# Patient Record
Sex: FEMALE | Race: WHITE | URBAN - METROPOLITAN AREA
[De-identification: names, ages, dates, MRNs, and addresses within clinical notes are randomized per-mention and may not be internally consistent; named-entity substitution may affect disease eponyms.]

---

## 2020-08-31 ENCOUNTER — IP HISTORICAL/CONVERTED ENCOUNTER (OUTPATIENT)
Dept: OTHER | Age: 85
End: 2020-08-31

## 2022-01-12 ENCOUNTER — PREPPED CHART (OUTPATIENT)
Dept: URBAN - METROPOLITAN AREA CLINIC 67 | Facility: CLINIC | Age: 87
End: 2022-01-12

## 2022-01-12 PROBLEM — H35.3212 NEOVASCULAR AMD WITH INACTIVE CNV: Noted: 2022-01-12

## 2022-01-12 PROBLEM — H35.3122 DRY AMD, INTERMEDIATE DRY STAGE: Noted: 2022-01-12

## 2022-01-12 PROBLEM — H35.713 CENTRAL SEROUS RETINOPATHY: Noted: 2022-01-12

## 2022-01-12 PROBLEM — H43.813 POSTERIOR VITREOUS DETACHMENT: Noted: 2022-01-12

## 2022-01-12 PROBLEM — H35.712 CENTRAL SEROUS RETINOPATHY: Noted: 2022-01-12

## 2022-03-24 ASSESSMENT — VISUAL ACUITY
OS_SC: 20/30-1
OD_SC: 20/40-1

## 2022-03-24 ASSESSMENT — TONOMETRY
OD_IOP_MMHG: 17
OS_IOP_MMHG: 18

## 2022-03-29 ENCOUNTER — FOLLOW UP (OUTPATIENT)
Dept: URBAN - METROPOLITAN AREA CLINIC 67 | Facility: CLINIC | Age: 87
End: 2022-03-29

## 2022-03-29 DIAGNOSIS — H35.3122: ICD-10-CM

## 2022-03-29 DIAGNOSIS — H35.3212: ICD-10-CM

## 2022-03-29 PROCEDURE — 92134 CPTRZ OPH DX IMG PST SGM RTA: CPT

## 2022-03-29 PROCEDURE — 92014 COMPRE OPH EXAM EST PT 1/>: CPT

## 2022-03-29 PROCEDURE — 92202 OPSCPY EXTND ON/MAC DRAW: CPT

## 2022-03-29 ASSESSMENT — TONOMETRY
OS_IOP_MMHG: 16
OD_IOP_MMHG: 15

## 2022-03-29 ASSESSMENT — VISUAL ACUITY
OS_SC: 20/70+2
OD_SC: 20/60+1
OS_PH: 20/50-2
OD_PH: 20/30-2

## 2022-06-22 ENCOUNTER — FOLLOW UP (OUTPATIENT)
Dept: URBAN - METROPOLITAN AREA CLINIC 67 | Facility: CLINIC | Age: 87
End: 2022-06-22

## 2022-06-22 DIAGNOSIS — H35.3212: ICD-10-CM

## 2022-06-22 DIAGNOSIS — H35.3122: ICD-10-CM

## 2022-06-22 PROCEDURE — 92202 OPSCPY EXTND ON/MAC DRAW: CPT

## 2022-06-22 PROCEDURE — 92014 COMPRE OPH EXAM EST PT 1/>: CPT

## 2022-06-22 PROCEDURE — 92134 CPTRZ OPH DX IMG PST SGM RTA: CPT

## 2022-06-22 ASSESSMENT — VISUAL ACUITY
OD_SC: 20/40+1
OS_PH: 20/40-1
OS_SC: 20/60
OD_PH: 20/25+1

## 2022-06-22 ASSESSMENT — TONOMETRY
OD_IOP_MMHG: 18
OS_IOP_MMHG: 15

## 2022-10-26 ENCOUNTER — FOLLOW UP (OUTPATIENT)
Dept: URBAN - METROPOLITAN AREA CLINIC 67 | Facility: CLINIC | Age: 87
End: 2022-10-26

## 2022-10-26 DIAGNOSIS — H35.3122: ICD-10-CM

## 2022-10-26 DIAGNOSIS — H35.3211: ICD-10-CM

## 2022-10-26 PROCEDURE — 92134 CPTRZ OPH DX IMG PST SGM RTA: CPT

## 2022-10-26 PROCEDURE — 67028 INJECTION EYE DRUG: CPT

## 2022-10-26 PROCEDURE — PFS EYLEA PFS

## 2022-10-26 PROCEDURE — 92235 FLUORESCEIN ANGRPH MLTIFRAME: CPT

## 2022-10-26 PROCEDURE — 99214 OFFICE O/P EST MOD 30 MIN: CPT | Mod: 25

## 2022-10-26 PROCEDURE — 92202 OPSCPY EXTND ON/MAC DRAW: CPT | Mod: 59

## 2022-10-26 ASSESSMENT — VISUAL ACUITY
OD_PH: 20/20-1
OD_SC: 20/40
OS_SC: 20/30
OS_PH: 20/25+1

## 2022-10-26 ASSESSMENT — TONOMETRY
OS_IOP_MMHG: 18
OD_IOP_MMHG: 14

## 2022-12-07 ENCOUNTER — FOLLOW UP (OUTPATIENT)
Dept: URBAN - METROPOLITAN AREA CLINIC 67 | Facility: CLINIC | Age: 87
End: 2022-12-07

## 2022-12-07 DIAGNOSIS — H35.3211: ICD-10-CM

## 2022-12-07 DIAGNOSIS — H35.713: ICD-10-CM

## 2022-12-07 DIAGNOSIS — H35.3122: ICD-10-CM

## 2022-12-07 PROCEDURE — 92134 CPTRZ OPH DX IMG PST SGM RTA: CPT

## 2022-12-07 PROCEDURE — 92202 OPSCPY EXTND ON/MAC DRAW: CPT | Mod: 59

## 2022-12-07 PROCEDURE — PFS EYLEA PFS

## 2022-12-07 PROCEDURE — 67028 INJECTION EYE DRUG: CPT

## 2022-12-07 PROCEDURE — 99214 OFFICE O/P EST MOD 30 MIN: CPT | Mod: 25

## 2022-12-07 ASSESSMENT — TONOMETRY
OD_IOP_MMHG: 18
OS_IOP_MMHG: 18

## 2022-12-07 ASSESSMENT — VISUAL ACUITY
OS_PH: 20/25-2
OD_SC: 20/60-2
OS_SC: 20/30-2
OD_PH: 20/30-2

## 2023-02-08 ENCOUNTER — FOLLOW UP (OUTPATIENT)
Dept: URBAN - METROPOLITAN AREA CLINIC 67 | Facility: CLINIC | Age: 88
End: 2023-02-08

## 2023-02-08 DIAGNOSIS — H35.3211: ICD-10-CM

## 2023-02-08 DIAGNOSIS — H35.713: ICD-10-CM

## 2023-02-08 DIAGNOSIS — H43.813: ICD-10-CM

## 2023-02-08 DIAGNOSIS — H35.3122: ICD-10-CM

## 2023-02-08 PROCEDURE — 92134 CPTRZ OPH DX IMG PST SGM RTA: CPT

## 2023-02-08 PROCEDURE — 92014 COMPRE OPH EXAM EST PT 1/>: CPT | Mod: 25

## 2023-02-08 PROCEDURE — 67028 INJECTION EYE DRUG: CPT

## 2023-02-08 PROCEDURE — PFS EYLEA PFS

## 2023-02-08 PROCEDURE — 92202 OPSCPY EXTND ON/MAC DRAW: CPT | Mod: 59

## 2023-02-08 ASSESSMENT — TONOMETRY
OD_IOP_MMHG: 18
OS_IOP_MMHG: 18

## 2023-02-08 ASSESSMENT — VISUAL ACUITY
OD_PH: 20/40-2
OD_SC: 20/50
OS_SC: 20/25-2

## 2023-04-12 ENCOUNTER — FOLLOW UP (OUTPATIENT)
Dept: URBAN - METROPOLITAN AREA CLINIC 67 | Facility: CLINIC | Age: 88
End: 2023-04-12

## 2023-04-12 DIAGNOSIS — H35.713: ICD-10-CM

## 2023-04-12 DIAGNOSIS — H35.3211: ICD-10-CM

## 2023-04-12 DIAGNOSIS — H43.813: ICD-10-CM

## 2023-04-12 DIAGNOSIS — H35.3122: ICD-10-CM

## 2023-04-12 PROCEDURE — 67028 INJECTION EYE DRUG: CPT

## 2023-04-12 PROCEDURE — 92014 COMPRE OPH EXAM EST PT 1/>: CPT | Mod: 25

## 2023-04-12 PROCEDURE — 92202 OPSCPY EXTND ON/MAC DRAW: CPT | Mod: 59

## 2023-04-12 PROCEDURE — PFS EYLEA PFS

## 2023-04-12 PROCEDURE — 92134 CPTRZ OPH DX IMG PST SGM RTA: CPT

## 2023-04-12 ASSESSMENT — VISUAL ACUITY
OS_SC: 20/30+2
OS_CC: 20/30+2
OD_SC: 20/50-2
OD_PH: 20/40

## 2023-04-12 ASSESSMENT — TONOMETRY
OS_IOP_MMHG: 18
OD_IOP_MMHG: 19

## 2023-06-13 ENCOUNTER — FOLLOW UP (OUTPATIENT)
Dept: URBAN - METROPOLITAN AREA CLINIC 67 | Facility: CLINIC | Age: 88
End: 2023-06-13

## 2023-06-13 DIAGNOSIS — H35.3122: ICD-10-CM

## 2023-06-13 DIAGNOSIS — H35.3211: ICD-10-CM

## 2023-06-13 PROCEDURE — 92134 CPTRZ OPH DX IMG PST SGM RTA: CPT

## 2023-06-13 PROCEDURE — 92202 OPSCPY EXTND ON/MAC DRAW: CPT

## 2023-06-13 PROCEDURE — 92014 COMPRE OPH EXAM EST PT 1/>: CPT

## 2023-06-13 ASSESSMENT — VISUAL ACUITY
OD_PH: 20/30
OD_SC: 20/50
OS_PH: 20/25
OS_SC: 20/30

## 2023-06-13 ASSESSMENT — TONOMETRY
OS_IOP_MMHG: 20
OD_IOP_MMHG: 18

## 2023-08-08 ENCOUNTER — FOLLOW UP (OUTPATIENT)
Dept: URBAN - METROPOLITAN AREA CLINIC 67 | Facility: CLINIC | Age: 88
End: 2023-08-08

## 2023-08-08 DIAGNOSIS — H35.713: ICD-10-CM

## 2023-08-08 DIAGNOSIS — G45.3: ICD-10-CM

## 2023-08-08 DIAGNOSIS — Z96.1: ICD-10-CM

## 2023-08-08 DIAGNOSIS — H43.813: ICD-10-CM

## 2023-08-08 DIAGNOSIS — H35.3212: ICD-10-CM

## 2023-08-08 DIAGNOSIS — H35.3122: ICD-10-CM

## 2023-08-08 PROCEDURE — 92014 COMPRE OPH EXAM EST PT 1/>: CPT

## 2023-08-08 PROCEDURE — 92202 OPSCPY EXTND ON/MAC DRAW: CPT

## 2023-08-08 PROCEDURE — 92134 CPTRZ OPH DX IMG PST SGM RTA: CPT

## 2023-08-08 ASSESSMENT — TONOMETRY
OS_IOP_MMHG: 19
OD_IOP_MMHG: 20

## 2023-08-08 ASSESSMENT — VISUAL ACUITY
OD_PH: 20/30
OS_SC: 20/25
OD_SC: 20/40

## 2023-09-19 ENCOUNTER — FOLLOW UP (OUTPATIENT)
Dept: URBAN - METROPOLITAN AREA CLINIC 67 | Facility: CLINIC | Age: 88
End: 2023-09-19

## 2023-09-19 DIAGNOSIS — G45.3: ICD-10-CM

## 2023-09-19 DIAGNOSIS — H35.3211: ICD-10-CM

## 2023-09-19 DIAGNOSIS — H35.3122: ICD-10-CM

## 2023-09-19 DIAGNOSIS — H43.813: ICD-10-CM

## 2023-09-19 DIAGNOSIS — H35.713: ICD-10-CM

## 2023-09-19 PROCEDURE — PFS EYLEA PFS: Mod: JZ

## 2023-09-19 PROCEDURE — 92014 COMPRE OPH EXAM EST PT 1/>: CPT | Mod: 25

## 2023-09-19 PROCEDURE — 92202 OPSCPY EXTND ON/MAC DRAW: CPT | Mod: 59

## 2023-09-19 PROCEDURE — 92134 CPTRZ OPH DX IMG PST SGM RTA: CPT

## 2023-09-19 PROCEDURE — 67028 INJECTION EYE DRUG: CPT

## 2023-09-19 ASSESSMENT — VISUAL ACUITY
OS_SC: 20/25
OD_SC: 20/40-2

## 2023-11-14 ENCOUNTER — FOLLOW UP (OUTPATIENT)
Dept: URBAN - METROPOLITAN AREA CLINIC 67 | Facility: CLINIC | Age: 88
End: 2023-11-14

## 2023-11-14 DIAGNOSIS — H35.3122: ICD-10-CM

## 2023-11-14 DIAGNOSIS — H35.713: ICD-10-CM

## 2023-11-14 DIAGNOSIS — H43.813: ICD-10-CM

## 2023-11-14 DIAGNOSIS — H35.3211: ICD-10-CM

## 2023-11-14 PROCEDURE — 92134 CPTRZ OPH DX IMG PST SGM RTA: CPT

## 2023-11-14 PROCEDURE — 67028 INJECTION EYE DRUG: CPT

## 2023-11-14 PROCEDURE — 92014 COMPRE OPH EXAM EST PT 1/>: CPT | Mod: 25

## 2023-11-14 PROCEDURE — HD EYLEA HD 8MG: Mod: JZ

## 2023-11-14 ASSESSMENT — VISUAL ACUITY: OD_SC: 20/30-2

## 2023-11-14 ASSESSMENT — TONOMETRY: OD_IOP_MMHG: 20

## 2023-12-21 ENCOUNTER — ANESTHESIA EVENT (OUTPATIENT)
Facility: HOSPITAL | Age: 88
DRG: 517 | End: 2023-12-21
Payer: MEDICARE

## 2023-12-21 ENCOUNTER — ANESTHESIA (OUTPATIENT)
Facility: HOSPITAL | Age: 88
DRG: 517 | End: 2023-12-21
Payer: MEDICARE

## 2023-12-21 ENCOUNTER — HOSPITAL ENCOUNTER (INPATIENT)
Facility: HOSPITAL | Age: 88
LOS: 2 days | Discharge: NURSING, REHAB OR LONG-TERM CARE | DRG: 517 | End: 2023-12-23
Attending: EMERGENCY MEDICINE | Admitting: FAMILY MEDICINE
Payer: MEDICARE

## 2023-12-21 DIAGNOSIS — S82.092A CLOSED SLEEVE FRACTURE OF LEFT PATELLA, INITIAL ENCOUNTER: Primary | ICD-10-CM

## 2023-12-21 DIAGNOSIS — S82.002A CLOSED DISPLACED FRACTURE OF LEFT PATELLA, UNSPECIFIED FRACTURE MORPHOLOGY, INITIAL ENCOUNTER: ICD-10-CM

## 2023-12-21 PROBLEM — Q74.1 PATELLA DYSPLASIA: Status: ACTIVE | Noted: 2023-12-21

## 2023-12-21 LAB
ALBUMIN SERPL-MCNC: 3.5 G/DL (ref 3.5–5)
ALBUMIN/GLOB SERPL: 1 (ref 1.1–2.2)
ALP SERPL-CCNC: 66 U/L (ref 45–117)
ALT SERPL-CCNC: 29 U/L (ref 12–78)
ANION GAP SERPL CALC-SCNC: 4 MMOL/L (ref 5–15)
APTT PPP: 29.4 SEC (ref 21.2–34.1)
AST SERPL W P-5'-P-CCNC: 26 U/L (ref 15–37)
BASOPHILS # BLD: 0 K/UL (ref 0–0.1)
BASOPHILS NFR BLD: 0 % (ref 0–1)
BILIRUB SERPL-MCNC: 0.8 MG/DL (ref 0.2–1)
BUN SERPL-MCNC: 22 MG/DL (ref 6–20)
BUN/CREAT SERPL: 20 (ref 12–20)
CA-I BLD-MCNC: 8.6 MG/DL (ref 8.5–10.1)
CHLORIDE SERPL-SCNC: 111 MMOL/L (ref 97–108)
CO2 SERPL-SCNC: 27 MMOL/L (ref 21–32)
CREAT SERPL-MCNC: 1.1 MG/DL (ref 0.55–1.02)
DIFFERENTIAL METHOD BLD: ABNORMAL
EOSINOPHIL # BLD: 0 K/UL (ref 0–0.4)
EOSINOPHIL NFR BLD: 0 % (ref 0–7)
ERYTHROCYTE [DISTWIDTH] IN BLOOD BY AUTOMATED COUNT: 14.5 % (ref 11.5–14.5)
GLOBULIN SER CALC-MCNC: 3.5 G/DL (ref 2–4)
GLUCOSE SERPL-MCNC: 121 MG/DL (ref 65–100)
HCT VFR BLD AUTO: 34.4 % (ref 35–47)
HGB BLD-MCNC: 11.3 G/DL (ref 11.5–16)
IMM GRANULOCYTES # BLD AUTO: 0 K/UL (ref 0–0.04)
IMM GRANULOCYTES NFR BLD AUTO: 0 % (ref 0–0.5)
INR PPP: 1.1 (ref 0.9–1.1)
LYMPHOCYTES # BLD: 0.5 K/UL (ref 0.8–3.5)
LYMPHOCYTES NFR BLD: 6 % (ref 12–49)
MCH RBC QN AUTO: 30.4 PG (ref 26–34)
MCHC RBC AUTO-ENTMCNC: 32.8 G/DL (ref 30–36.5)
MCV RBC AUTO: 92.5 FL (ref 80–99)
MONOCYTES # BLD: 1 K/UL (ref 0–1)
MONOCYTES NFR BLD: 12 % (ref 5–13)
NEUTS SEG # BLD: 6.7 K/UL (ref 1.8–8)
NEUTS SEG NFR BLD: 82 % (ref 32–75)
NRBC # BLD: 0 K/UL (ref 0–0.01)
NRBC BLD-RTO: 0 PER 100 WBC
PLATELET # BLD AUTO: 122 K/UL (ref 150–400)
PMV BLD AUTO: 10.9 FL (ref 8.9–12.9)
POTASSIUM SERPL-SCNC: 4.5 MMOL/L (ref 3.5–5.1)
PROT SERPL-MCNC: 7 G/DL (ref 6.4–8.2)
PROTHROMBIN TIME: 14.9 SEC (ref 11.9–14.6)
RBC # BLD AUTO: 3.72 M/UL (ref 3.8–5.2)
SODIUM SERPL-SCNC: 142 MMOL/L (ref 136–145)
THERAPEUTIC RANGE: NORMAL SEC (ref 82–109)
WBC # BLD AUTO: 8.2 K/UL (ref 3.6–11)

## 2023-12-21 PROCEDURE — 1100000000 HC RM PRIVATE

## 2023-12-21 PROCEDURE — 2580000003 HC RX 258: Performed by: FAMILY MEDICINE

## 2023-12-21 PROCEDURE — 99285 EMERGENCY DEPT VISIT HI MDM: CPT

## 2023-12-21 PROCEDURE — 85025 COMPLETE CBC W/AUTO DIFF WBC: CPT

## 2023-12-21 PROCEDURE — 80053 COMPREHEN METABOLIC PANEL: CPT

## 2023-12-21 PROCEDURE — 85610 PROTHROMBIN TIME: CPT

## 2023-12-21 PROCEDURE — 6370000000 HC RX 637 (ALT 250 FOR IP): Performed by: FAMILY MEDICINE

## 2023-12-21 PROCEDURE — 36415 COLL VENOUS BLD VENIPUNCTURE: CPT

## 2023-12-21 PROCEDURE — 6370000000 HC RX 637 (ALT 250 FOR IP): Performed by: ORTHOPAEDIC SURGERY

## 2023-12-21 PROCEDURE — 6360000002 HC RX W HCPCS: Performed by: FAMILY MEDICINE

## 2023-12-21 PROCEDURE — 85730 THROMBOPLASTIN TIME PARTIAL: CPT

## 2023-12-21 RX ORDER — TRAMADOL HYDROCHLORIDE 50 MG/1
50 TABLET ORAL EVERY 6 HOURS PRN
Status: DISCONTINUED | OUTPATIENT
Start: 2023-12-21 | End: 2023-12-24 | Stop reason: HOSPADM

## 2023-12-21 RX ORDER — ACETAMINOPHEN 650 MG/1
650 SUPPOSITORY RECTAL EVERY 6 HOURS PRN
Status: DISCONTINUED | OUTPATIENT
Start: 2023-12-21 | End: 2023-12-21

## 2023-12-21 RX ORDER — ACETAMINOPHEN 325 MG/1
650 TABLET ORAL EVERY 6 HOURS PRN
Status: DISCONTINUED | OUTPATIENT
Start: 2023-12-21 | End: 2023-12-21

## 2023-12-21 RX ORDER — ONDANSETRON 2 MG/ML
4 INJECTION INTRAMUSCULAR; INTRAVENOUS EVERY 6 HOURS PRN
Status: DISCONTINUED | OUTPATIENT
Start: 2023-12-21 | End: 2023-12-24 | Stop reason: HOSPADM

## 2023-12-21 RX ORDER — SODIUM CHLORIDE 9 MG/ML
INJECTION, SOLUTION INTRAVENOUS PRN
Status: DISCONTINUED | OUTPATIENT
Start: 2023-12-21 | End: 2023-12-24 | Stop reason: HOSPADM

## 2023-12-21 RX ORDER — ACETAMINOPHEN 500 MG
1000 TABLET ORAL EVERY 8 HOURS SCHEDULED
Status: DISCONTINUED | OUTPATIENT
Start: 2023-12-21 | End: 2023-12-24 | Stop reason: HOSPADM

## 2023-12-21 RX ORDER — POTASSIUM CHLORIDE 20 MEQ/1
40 TABLET, EXTENDED RELEASE ORAL PRN
Status: DISCONTINUED | OUTPATIENT
Start: 2023-12-21 | End: 2023-12-24 | Stop reason: HOSPADM

## 2023-12-21 RX ORDER — POTASSIUM CHLORIDE 7.45 MG/ML
10 INJECTION INTRAVENOUS PRN
Status: DISCONTINUED | OUTPATIENT
Start: 2023-12-21 | End: 2023-12-24 | Stop reason: HOSPADM

## 2023-12-21 RX ORDER — POLYETHYLENE GLYCOL 3350 17 G/17G
17 POWDER, FOR SOLUTION ORAL DAILY PRN
Status: DISCONTINUED | OUTPATIENT
Start: 2023-12-21 | End: 2023-12-24 | Stop reason: HOSPADM

## 2023-12-21 RX ORDER — SODIUM CHLORIDE 0.9 % (FLUSH) 0.9 %
5-40 SYRINGE (ML) INJECTION EVERY 12 HOURS SCHEDULED
Status: DISCONTINUED | OUTPATIENT
Start: 2023-12-21 | End: 2023-12-24 | Stop reason: HOSPADM

## 2023-12-21 RX ORDER — ENOXAPARIN SODIUM 100 MG/ML
30 INJECTION SUBCUTANEOUS DAILY
Status: DISCONTINUED | OUTPATIENT
Start: 2023-12-21 | End: 2023-12-21

## 2023-12-21 RX ORDER — SODIUM CHLORIDE 9 MG/ML
INJECTION, SOLUTION INTRAVENOUS CONTINUOUS
Status: DISCONTINUED | OUTPATIENT
Start: 2023-12-21 | End: 2023-12-24 | Stop reason: HOSPADM

## 2023-12-21 RX ORDER — OXYCODONE HYDROCHLORIDE 5 MG/1
5 TABLET ORAL EVERY 4 HOURS PRN
Status: DISCONTINUED | OUTPATIENT
Start: 2023-12-21 | End: 2023-12-24 | Stop reason: HOSPADM

## 2023-12-21 RX ORDER — ONDANSETRON 4 MG/1
4 TABLET, ORALLY DISINTEGRATING ORAL EVERY 8 HOURS PRN
Status: DISCONTINUED | OUTPATIENT
Start: 2023-12-21 | End: 2023-12-24 | Stop reason: HOSPADM

## 2023-12-21 RX ORDER — SODIUM CHLORIDE 0.9 % (FLUSH) 0.9 %
5-40 SYRINGE (ML) INJECTION PRN
Status: DISCONTINUED | OUTPATIENT
Start: 2023-12-21 | End: 2023-12-24 | Stop reason: HOSPADM

## 2023-12-21 RX ORDER — HEPARIN SODIUM 5000 [USP'U]/ML
5000 INJECTION, SOLUTION INTRAVENOUS; SUBCUTANEOUS 2 TIMES DAILY
Status: DISCONTINUED | OUTPATIENT
Start: 2023-12-21 | End: 2023-12-22

## 2023-12-21 RX ORDER — HYDROCODONE BITARTRATE AND ACETAMINOPHEN 5; 325 MG/1; MG/1
1 TABLET ORAL EVERY 6 HOURS PRN
Status: DISCONTINUED | OUTPATIENT
Start: 2023-12-21 | End: 2023-12-21

## 2023-12-21 RX ORDER — MAGNESIUM SULFATE IN WATER 40 MG/ML
2000 INJECTION, SOLUTION INTRAVENOUS PRN
Status: DISCONTINUED | OUTPATIENT
Start: 2023-12-21 | End: 2023-12-24 | Stop reason: HOSPADM

## 2023-12-21 RX ADMIN — SODIUM CHLORIDE, PRESERVATIVE FREE 10 ML: 5 INJECTION INTRAVENOUS at 19:53

## 2023-12-21 RX ADMIN — OXYCODONE 5 MG: 5 TABLET ORAL at 23:41

## 2023-12-21 RX ADMIN — ACETAMINOPHEN 1000 MG: 500 TABLET ORAL at 21:40

## 2023-12-21 RX ADMIN — HEPARIN SODIUM 5000 UNITS: 5000 INJECTION INTRAVENOUS; SUBCUTANEOUS at 21:41

## 2023-12-21 RX ADMIN — SODIUM CHLORIDE: 9 INJECTION, SOLUTION INTRAVENOUS at 13:14

## 2023-12-21 RX ADMIN — HYDROCODONE BITARTRATE AND ACETAMINOPHEN 1 TABLET: 5; 325 TABLET ORAL at 18:57

## 2023-12-21 RX ADMIN — TRAMADOL HYDROCHLORIDE 50 MG: 50 TABLET ORAL at 21:40

## 2023-12-21 NOTE — CONSULTS
ORTHOPEDIC CONSULT    Patient: Chata Kasper MRN: [id number]  SSN: [id number]    YOB: 1934  Age: 80 y.o. Sex: female      Subjective:      Chata Kasper is a 80 y.o. female who is being seen for left knee pain and inability to ambulate. She sustained a fall yesterday at [employer] and was transported to [facility] where she was diagnosed with a patella fracture. She was to be discharged this AM but unable to ambulate and the decision to transfer her for surgical intervention was made. She arrived in the ED and and was admitted to the medicine team  with a consult to ortho for surgical intervention. No past medical history on file. No past surgical history on file. No family history on file. Social History     Tobacco Use    Smoking status: Not on file    Smokeless tobacco: Not on file   Substance Use Topics    Alcohol use: Not on file      Prior to Admission medications    Medication Sig Start Date End Date Taking? Authorizing Provider   HYDROcodone-acetaminophen (NORCO) 5-325 MG per tablet Take 1 tablet by mouth every 6 hours as needed for Pain for up to 3 days. Intended supply: 3 days. Take lowest dose possible to manage pain Max Daily Amount: 4 tablets  Patient not taking: Reported on 12/21/2023 12/20/23 12/23/23  Cecil Ulloa MD       No Known Allergies    Review of Systems:  Review of Systems   Constitutional: Negative. HENT: Negative. Eyes: Negative. Respiratory: Negative. Cardiovascular: Negative. Gastrointestinal: Negative. Endocrine: Negative. Genitourinary: Negative. Musculoskeletal:  Positive for arthralgias and gait problem. Left knee pain S/P acute patella fx. Skin:  Positive for wound. Abrasion right suborbital, right palm and left long finger S/P fall. Allergic/Immunologic: Negative. Hematological: Negative. Psychiatric/Behavioral: Negative.              Objective:     Current Facility-Administered Medications   Medication

## 2023-12-21 NOTE — PROGRESS NOTES
4 Eyes Skin Assessment     NAME:  Lilly Germain  YOB: 1934  MEDICAL RECORD NUMBER:  [id number]    The patient is being assessed for  Admission    I agree that at least one RN has performed a thorough Head to Toe Skin Assessment on the patient. ALL assessment sites listed below have been assessed. Areas assessed by both nurses:    Head, Face, Ears, Shoulders, Back, Chest, Arms, Elbows, Hands, Sacrum. Buttock, Coccyx, Ischium, Legs. Feet and Heels, and Under Medical Devices         Does the Patient have a Wound?  No noted wound(s)       Bright Prevention initiated by RN: No  Wound Care Orders initiated by RN: No    Pressure Injury (Stage 3,4, Unstageable, DTI, NWPT, and Complex wounds) if present, place Wound referral order by RN under : No    New Ostomies, if present place, Ostomy referral order under : No     Nurse 1 eSignature: Electronically signed by Rolando Reza RN on 12/21/23 at 5:18 PM EST    **SHARE this note so that the co-signing nurse can place an eSignature**    Nurse 2 eSignature: Electronically signed by Jarocho Best RN on 12/21/23 at 5:18 PM EST

## 2023-12-21 NOTE — ED PROVIDER NOTES
[facility] EMERGENCY DEPT  EMERGENCY DEPARTMENT HISTORY AND PHYSICAL EXAM      Date: 12/21/2023  Patient Name: Nehal Morales  MRN: [id number]  [address] [facility], [place], operated by [facility] 11/26/1934  Date of evaluation: 12/21/2023  Provider: Loren Blue MD   Note Started: 12:15 PM EST 12/21/23    HISTORY OF PRESENT ILLNESS     Chief Complaint   Patient presents with    Fall     Yesterday afternoon patient was walking, tripped and fell, landing on concrete on her left knee. Patient went to [facility] and was discharged but at the time of discharge patient was unable to walk with a severe pain and decision was made that patient will be transfer here for further evaluation/treatment. History Provided By: Patient    HPI: Nehal Morales is a 80 y.o. female presents to the emergency department with complaint of ground-level fall with resultant left patellar fracture. Patient was seen at an outside hospital for the same and was attempted be discharged but she was unable to ambulate due to the pain. Case was discussed with orthopedics here who agreed to fast-track her surgery. Patient reports left knee pain but states it is improved since I put on the brace. PAST MEDICAL HISTORY   Past Medical History:  No past medical history on file. Past Surgical History:  No past surgical history on file. Family History:  No family history on file. Social History: Allergies:  No Known Allergies    PCP: No primary care provider on file.     Current Meds:   Current Facility-Administered Medications   Medication Dose Route Frequency Provider Last Rate Last Admin    HYDROcodone-acetaminophen (NORCO) 5-325 MG per tablet 1 tablet  1 tablet Oral Q6H PRN Gustavo Mosley MD        0.9 % sodium chloride infusion   IntraVENous Continuous Gustavo Mosley MD        sodium chloride flush 0.9 % injection 5-40 mL  5-40 mL IntraVENous 2 times per day Gustavo Mosley MD        sodium chloride flush 0.9 % injection 5-40 mL  5-40 mL IntraVENous PRN discussed)  IP CONSULT TO ORTHOPEDIC SURGERY     Social Determinants affecting Dx or Tx: Patient lacks primary care provider. Smoking Cessation: Not Applicable    PROCEDURES   Unless otherwise noted above, none  Procedures      CRITICAL CARE TIME   Patient does not meet Critical Care Time, 0 minutes    ED FINAL IMPRESSION     1. Closed sleeve fracture of left patella, initial encounter          DISPOSITION/PLAN   DISPOSITION Admitted 12/21/2023 12:33:35 PM    Discharge Note: The patient is stable for discharge home. The signs, symptoms, diagnosis, and discharge instructions have been discussed, understanding conveyed, and agreed upon. The patient is to follow up as recommended or return to ER should their symptoms worsen. PATIENT REFERRED TO:  No follow-up provider specified. DISCHARGE MEDICATIONS:     Medication List        ASK your doctor about these medications      HYDROcodone-acetaminophen 5-325 MG per tablet  Commonly known as: Norco  Take 1 tablet by mouth every 6 hours as needed for Pain for up to 3 days. Intended supply: 3 days. Take lowest dose possible to manage pain Max Daily Amount: 4 tablets                DISCONTINUED MEDICATIONS:  Current Discharge Medication List          I am the Primary Clinician of Record. Gabriela Rios MD (electronically signed)    (Please note that parts of this dictation were completed with voice recognition software. Quite often unanticipated grammatical, syntax, homophones, and other interpretive errors are inadvertently transcribed by the computer software. Please disregards these errors.  Please excuse any errors that have escaped final proofreading.)     Gabriela Rios MD  12/21/23 4477

## 2023-12-21 NOTE — ED NOTES
Patient is being admitted for further evaluation of the left knee fracture and possible surgical intervention. Patient stated she has no pain if she does not move and only had pain with movement. Patient is NPO > 12 hours.  Report called to [address] [facility] room 524 to [facility]

## 2023-12-21 NOTE — CARE COORDINATION
12/21/23 1342   Service Assessment   Patient Orientation Alert and Oriented   Cognition Alert   History Provided By Patient   Primary Caregiver Self   Accompanied By/Relationship Pt alone during interview. [address] is: Legal Next of Kin   PCP Verified by CM Yes  (Per pt no PCP.)   Last Visit to PCP   (No PCP.)   Prior Functional Level Independent in ADLs/IADLs   Current Functional Level Independent in ADLs/IADLs   Can patient return to prior living arrangement Yes   Ability to make needs known: Good   Family able to assist with home care needs: Yes   Would you like for me to discuss the discharge plan with any other family members/significant others, and if so, who? Yes  (Son Berhane Nicolas)   Financial Resources [employer] None   Social/Functional History   Lives With Alone   Type of [address] [place] [address] One level   Home Access Stairs to enter without rails   Entrance Stairs - Number of Steps 5 outside steps. Bathroom Shower/Tub Tub/Shower unit   Bathroom Toilet Standard   Bathroom Accessibility Accessible   Home Equipment None   Receives Help From Family;Friend(s)   ADL Assistance Independent   Homemaking Assistance Independent   Homemaking Responsibilities Yes   Ambulation Assistance Independent   Transfer Assistance Independent   Active  Yes   Occupation Retired   Discharge Planning   Type of [address] Prior To Admission None   Potential Assistance Needed N/A   DME Ordered? No   Potential Assistance Purchasing Medications No   Type of Home Care Services None   Patient expects to be discharged to: House   One/Two Story Residence One story   History of falls? [address] Discharge   Transition of Care Consult (CM Consult) Discharge Planning   Services At/After Discharge None    Resource Information Provided?  No   Mode of Transport at Discharge Other (see

## 2023-12-22 ENCOUNTER — APPOINTMENT (OUTPATIENT)
Facility: HOSPITAL | Age: 88
DRG: 517 | End: 2023-12-22
Payer: MEDICARE

## 2023-12-22 LAB
ALBUMIN SERPL-MCNC: 3.2 G/DL (ref 3.5–5)
ALBUMIN/GLOB SERPL: 1.1 (ref 1.1–2.2)
ALP SERPL-CCNC: 61 U/L (ref 45–117)
ALT SERPL-CCNC: 25 U/L (ref 12–78)
ANION GAP SERPL CALC-SCNC: 2 MMOL/L (ref 5–15)
AST SERPL W P-5'-P-CCNC: 22 U/L (ref 15–37)
BASOPHILS # BLD: 0 K/UL (ref 0–0.1)
BASOPHILS NFR BLD: 0 % (ref 0–1)
BILIRUB SERPL-MCNC: 0.8 MG/DL (ref 0.2–1)
BUN SERPL-MCNC: 25 MG/DL (ref 6–20)
BUN/CREAT SERPL: 23 (ref 12–20)
CA-I BLD-MCNC: 8.4 MG/DL (ref 8.5–10.1)
CHLORIDE SERPL-SCNC: 111 MMOL/L (ref 97–108)
CO2 SERPL-SCNC: 32 MMOL/L (ref 21–32)
CREAT SERPL-MCNC: 1.07 MG/DL (ref 0.55–1.02)
DIFFERENTIAL METHOD BLD: ABNORMAL
EOSINOPHIL # BLD: 0.1 K/UL (ref 0–0.4)
EOSINOPHIL NFR BLD: 2 % (ref 0–7)
ERYTHROCYTE [DISTWIDTH] IN BLOOD BY AUTOMATED COUNT: 14.6 % (ref 11.5–14.5)
GLOBULIN SER CALC-MCNC: 3 G/DL (ref 2–4)
GLUCOSE SERPL-MCNC: 117 MG/DL (ref 65–100)
HCT VFR BLD AUTO: 31.6 % (ref 35–47)
HGB BLD-MCNC: 10.2 G/DL (ref 11.5–16)
IMM GRANULOCYTES # BLD AUTO: 0 K/UL (ref 0–0.04)
IMM GRANULOCYTES NFR BLD AUTO: 0 % (ref 0–0.5)
LYMPHOCYTES # BLD: 0.7 K/UL (ref 0.8–3.5)
LYMPHOCYTES NFR BLD: 12 % (ref 12–49)
MCH RBC QN AUTO: 30.3 PG (ref 26–34)
MCHC RBC AUTO-ENTMCNC: 32.3 G/DL (ref 30–36.5)
MCV RBC AUTO: 93.8 FL (ref 80–99)
MONOCYTES # BLD: 0.7 K/UL (ref 0–1)
MONOCYTES NFR BLD: 12 % (ref 5–13)
NEUTS SEG # BLD: 4.4 K/UL (ref 1.8–8)
NEUTS SEG NFR BLD: 74 % (ref 32–75)
NRBC # BLD: 0 K/UL (ref 0–0.01)
NRBC BLD-RTO: 0 PER 100 WBC
PLATELET # BLD AUTO: 101 K/UL (ref 150–400)
PMV BLD AUTO: 11.3 FL (ref 8.9–12.9)
POTASSIUM SERPL-SCNC: 4 MMOL/L (ref 3.5–5.1)
PROT SERPL-MCNC: 6.2 G/DL (ref 6.4–8.2)
RBC # BLD AUTO: 3.37 M/UL (ref 3.8–5.2)
SODIUM SERPL-SCNC: 145 MMOL/L (ref 136–145)
WBC # BLD AUTO: 6 K/UL (ref 3.6–11)

## 2023-12-22 PROCEDURE — C1769 GUIDE WIRE: HCPCS | Performed by: ORTHOPAEDIC SURGERY

## 2023-12-22 PROCEDURE — 6360000002 HC RX W HCPCS: Performed by: ORTHOPAEDIC SURGERY

## 2023-12-22 PROCEDURE — 27524 TREAT KNEECAP FRACTURE: CPT | Performed by: ORTHOPAEDIC SURGERY

## 2023-12-22 PROCEDURE — 3600000014 HC SURGERY LEVEL 4 ADDTL 15MIN: Performed by: ORTHOPAEDIC SURGERY

## 2023-12-22 PROCEDURE — 6360000002 HC RX W HCPCS: Performed by: ANESTHESIOLOGY

## 2023-12-22 PROCEDURE — C1713 ANCHOR/SCREW BN/BN,TIS/BN: HCPCS | Performed by: ORTHOPAEDIC SURGERY

## 2023-12-22 PROCEDURE — 36415 COLL VENOUS BLD VENIPUNCTURE: CPT

## 2023-12-22 PROCEDURE — 6370000000 HC RX 637 (ALT 250 FOR IP): Performed by: ORTHOPAEDIC SURGERY

## 2023-12-22 PROCEDURE — 2580000003 HC RX 258: Performed by: ORTHOPAEDIC SURGERY

## 2023-12-22 PROCEDURE — 6360000002 HC RX W HCPCS: Performed by: FAMILY MEDICINE

## 2023-12-22 PROCEDURE — 85025 COMPLETE CBC W/AUTO DIFF WBC: CPT

## 2023-12-22 PROCEDURE — 3600000004 HC SURGERY LEVEL 4 BASE: Performed by: ORTHOPAEDIC SURGERY

## 2023-12-22 PROCEDURE — 2580000003 HC RX 258: Performed by: ANESTHESIOLOGY

## 2023-12-22 PROCEDURE — 2500000003 HC RX 250 WO HCPCS: Performed by: NURSE ANESTHETIST, CERTIFIED REGISTERED

## 2023-12-22 PROCEDURE — 3700000001 HC ADD 15 MINUTES (ANESTHESIA): Performed by: ORTHOPAEDIC SURGERY

## 2023-12-22 PROCEDURE — 80053 COMPREHEN METABOLIC PANEL: CPT

## 2023-12-22 PROCEDURE — 3700000000 HC ANESTHESIA ATTENDED CARE: Performed by: ORTHOPAEDIC SURGERY

## 2023-12-22 PROCEDURE — 7100000000 HC PACU RECOVERY - FIRST 15 MIN: Performed by: ORTHOPAEDIC SURGERY

## 2023-12-22 PROCEDURE — 1100000000 HC RM PRIVATE

## 2023-12-22 PROCEDURE — 2720000010 HC SURG SUPPLY STERILE: Performed by: ORTHOPAEDIC SURGERY

## 2023-12-22 PROCEDURE — 0QSF04Z REPOSITION LEFT PATELLA WITH INTERNAL FIXATION DEVICE, OPEN APPROACH: ICD-10-PCS | Performed by: ORTHOPAEDIC SURGERY

## 2023-12-22 PROCEDURE — 2709999900 HC NON-CHARGEABLE SUPPLY: Performed by: ORTHOPAEDIC SURGERY

## 2023-12-22 PROCEDURE — 2580000003 HC RX 258: Performed by: NURSE ANESTHETIST, CERTIFIED REGISTERED

## 2023-12-22 PROCEDURE — 2580000003 HC RX 258: Performed by: FAMILY MEDICINE

## 2023-12-22 PROCEDURE — 76000 FLUOROSCOPY <1 HR PHYS/QHP: CPT

## 2023-12-22 PROCEDURE — 7100000001 HC PACU RECOVERY - ADDTL 15 MIN: Performed by: ORTHOPAEDIC SURGERY

## 2023-12-22 DEVICE — IMPLANTABLE DEVICE: Type: IMPLANTABLE DEVICE | Site: KNEE | Status: FUNCTIONAL

## 2023-12-22 DEVICE — SCREW BNE L34MM DIA4MM CANN BLNT TIP LO PROF FOR PAT FRAC: Type: IMPLANTABLE DEVICE | Site: KNEE | Status: FUNCTIONAL

## 2023-12-22 RX ORDER — BUPIVACAINE HYDROCHLORIDE 5 MG/ML
INJECTION, SOLUTION EPIDURAL; INTRACAUDAL PRN
Status: DISCONTINUED | OUTPATIENT
Start: 2023-12-22 | End: 2023-12-22 | Stop reason: ALTCHOICE

## 2023-12-22 RX ORDER — ONDANSETRON 4 MG/1
4 TABLET, ORALLY DISINTEGRATING ORAL EVERY 8 HOURS PRN
Qty: 10 TABLET | Refills: 1 | Status: SHIPPED | OUTPATIENT
Start: 2023-12-22

## 2023-12-22 RX ORDER — SODIUM CHLORIDE 0.9 % (FLUSH) 0.9 %
5-40 SYRINGE (ML) INJECTION PRN
Status: CANCELLED | OUTPATIENT
Start: 2023-12-22

## 2023-12-22 RX ORDER — SENNA AND DOCUSATE SODIUM 50; 8.6 MG/1; MG/1
1 TABLET, FILM COATED ORAL 2 TIMES DAILY PRN
Qty: 30 TABLET | Refills: 0 | Status: SHIPPED | OUTPATIENT
Start: 2023-12-22

## 2023-12-22 RX ORDER — BUPIVACAINE HYDROCHLORIDE 5 MG/ML
INJECTION, SOLUTION EPIDURAL; INTRACAUDAL
Status: DISPENSED
Start: 2023-12-22 | End: 2023-12-23

## 2023-12-22 RX ORDER — TRAMADOL HYDROCHLORIDE 50 MG/1
50 TABLET ORAL EVERY 6 HOURS PRN
Qty: 28 TABLET | Refills: 0 | Status: SHIPPED | OUTPATIENT
Start: 2023-12-22 | End: 2023-12-29

## 2023-12-22 RX ORDER — ASPIRIN 81 MG/1
81 TABLET ORAL 2 TIMES DAILY
Qty: 60 TABLET | Refills: 0 | Status: SHIPPED | OUTPATIENT
Start: 2023-12-22

## 2023-12-22 RX ORDER — LIDOCAINE 4 G/G
1 PATCH TOPICAL AS NEEDED
Status: CANCELLED | OUTPATIENT
Start: 2023-12-22

## 2023-12-22 RX ORDER — ACETAMINOPHEN 500 MG
500 TABLET ORAL EVERY 6 HOURS PRN
Qty: 40 TABLET | Refills: 1 | Status: SHIPPED | OUTPATIENT
Start: 2023-12-22

## 2023-12-22 RX ORDER — PROPOFOL 10 MG/ML
INJECTION, EMULSION INTRAVENOUS PRN
Status: DISCONTINUED | OUTPATIENT
Start: 2023-12-22 | End: 2023-12-22 | Stop reason: SDUPTHER

## 2023-12-22 RX ORDER — LORAZEPAM 2 MG/ML
0.5 INJECTION INTRAMUSCULAR
Status: CANCELLED | OUTPATIENT
Start: 2023-12-22 | End: 2023-12-23

## 2023-12-22 RX ORDER — OXYCODONE HYDROCHLORIDE 5 MG/1
5 TABLET ORAL PRN
Status: CANCELLED | OUTPATIENT
Start: 2023-12-22 | End: 2023-12-22

## 2023-12-22 RX ORDER — SODIUM CHLORIDE 0.9 % (FLUSH) 0.9 %
5-40 SYRINGE (ML) INJECTION EVERY 12 HOURS SCHEDULED
Status: CANCELLED | OUTPATIENT
Start: 2023-12-22

## 2023-12-22 RX ORDER — HYDRALAZINE HYDROCHLORIDE 20 MG/ML
10 INJECTION INTRAMUSCULAR; INTRAVENOUS
Status: CANCELLED | OUTPATIENT
Start: 2023-12-22

## 2023-12-22 RX ORDER — LABETALOL HYDROCHLORIDE 5 MG/ML
10 INJECTION, SOLUTION INTRAVENOUS
Status: CANCELLED | OUTPATIENT
Start: 2023-12-22

## 2023-12-22 RX ORDER — SODIUM CHLORIDE 9 MG/ML
INJECTION, SOLUTION INTRAVENOUS PRN
Status: CANCELLED | OUTPATIENT
Start: 2023-12-22

## 2023-12-22 RX ORDER — METOCLOPRAMIDE HYDROCHLORIDE 5 MG/ML
10 INJECTION INTRAMUSCULAR; INTRAVENOUS
Status: CANCELLED | OUTPATIENT
Start: 2023-12-22 | End: 2023-12-23

## 2023-12-22 RX ORDER — FENTANYL CITRATE 50 UG/ML
50 INJECTION, SOLUTION INTRAMUSCULAR; INTRAVENOUS EVERY 5 MIN PRN
Status: CANCELLED | OUTPATIENT
Start: 2023-12-22

## 2023-12-22 RX ORDER — BUPIVACAINE HYDROCHLORIDE 5 MG/ML
INJECTION, SOLUTION EPIDURAL; INTRACAUDAL
Status: COMPLETED
Start: 2023-12-22 | End: 2023-12-22

## 2023-12-22 RX ORDER — MEPERIDINE HYDROCHLORIDE 25 MG/ML
12.5 INJECTION INTRAMUSCULAR; INTRAVENOUS; SUBCUTANEOUS EVERY 5 MIN PRN
Status: CANCELLED | OUTPATIENT
Start: 2023-12-22

## 2023-12-22 RX ORDER — BUPIVACAINE HYDROCHLORIDE 5 MG/ML
INJECTION, SOLUTION EPIDURAL; INTRACAUDAL PRN
Status: DISCONTINUED | OUTPATIENT
Start: 2023-12-22 | End: 2023-12-22 | Stop reason: SDUPTHER

## 2023-12-22 RX ORDER — ONDANSETRON 2 MG/ML
4 INJECTION INTRAMUSCULAR; INTRAVENOUS
Status: CANCELLED | OUTPATIENT
Start: 2023-12-22 | End: 2023-12-23

## 2023-12-22 RX ORDER — OXYCODONE HYDROCHLORIDE 5 MG/1
5 TABLET ORAL
Status: COMPLETED | OUTPATIENT
Start: 2023-12-22 | End: 2023-12-22

## 2023-12-22 RX ORDER — OXYCODONE HYDROCHLORIDE 5 MG/1
10 TABLET ORAL PRN
Status: CANCELLED | OUTPATIENT
Start: 2023-12-22 | End: 2023-12-22

## 2023-12-22 RX ORDER — DEXTROSE MONOHYDRATE 100 MG/ML
INJECTION, SOLUTION INTRAVENOUS CONTINUOUS PRN
Status: CANCELLED | OUTPATIENT
Start: 2023-12-22

## 2023-12-22 RX ORDER — DIPHENHYDRAMINE HYDROCHLORIDE 50 MG/ML
12.5 INJECTION INTRAMUSCULAR; INTRAVENOUS
Status: CANCELLED | OUTPATIENT
Start: 2023-12-22 | End: 2023-12-23

## 2023-12-22 RX ORDER — ASPIRIN 81 MG/1
81 TABLET, CHEWABLE ORAL 2 TIMES DAILY
Status: DISCONTINUED | OUTPATIENT
Start: 2023-12-22 | End: 2023-12-24 | Stop reason: HOSPADM

## 2023-12-22 RX ORDER — SODIUM CHLORIDE, SODIUM LACTATE, POTASSIUM CHLORIDE, CALCIUM CHLORIDE 600; 310; 30; 20 MG/100ML; MG/100ML; MG/100ML; MG/100ML
INJECTION, SOLUTION INTRAVENOUS ONCE
Status: CANCELLED | OUTPATIENT
Start: 2023-12-22 | End: 2023-12-22

## 2023-12-22 RX ORDER — IPRATROPIUM BROMIDE AND ALBUTEROL SULFATE 2.5; .5 MG/3ML; MG/3ML
1 SOLUTION RESPIRATORY (INHALATION)
Status: CANCELLED | OUTPATIENT
Start: 2023-12-22 | End: 2023-12-23

## 2023-12-22 RX ORDER — HYDROMORPHONE HYDROCHLORIDE 1 MG/ML
0.5 INJECTION, SOLUTION INTRAMUSCULAR; INTRAVENOUS; SUBCUTANEOUS EVERY 5 MIN PRN
Status: CANCELLED | OUTPATIENT
Start: 2023-12-22

## 2023-12-22 RX ADMIN — HEPARIN SODIUM 5000 UNITS: 5000 INJECTION INTRAVENOUS; SUBCUTANEOUS at 08:22

## 2023-12-22 RX ADMIN — SODIUM CHLORIDE: 9 INJECTION, SOLUTION INTRAVENOUS at 02:45

## 2023-12-22 RX ADMIN — SODIUM CHLORIDE, PRESERVATIVE FREE 10 ML: 5 INJECTION INTRAVENOUS at 20:48

## 2023-12-22 RX ADMIN — PROPOFOL 20 MG: 10 INJECTION, EMULSION INTRAVENOUS at 14:44

## 2023-12-22 RX ADMIN — OXYCODONE 5 MG: 5 TABLET ORAL at 00:50

## 2023-12-22 RX ADMIN — SODIUM CHLORIDE, PRESERVATIVE FREE 10 ML: 5 INJECTION INTRAVENOUS at 08:22

## 2023-12-22 RX ADMIN — BUPIVACAINE HYDROCHLORIDE 2 ML: 5 INJECTION, SOLUTION EPIDURAL; INTRACAUDAL at 14:45

## 2023-12-22 RX ADMIN — PROPOFOL 50 MCG/KG/MIN: 10 INJECTION, EMULSION INTRAVENOUS at 14:51

## 2023-12-22 RX ADMIN — PROPOFOL 20 MG: 10 INJECTION, EMULSION INTRAVENOUS at 14:46

## 2023-12-22 RX ADMIN — OXYCODONE 5 MG: 5 TABLET ORAL at 20:45

## 2023-12-22 RX ADMIN — CEFAZOLIN 2000 MG: 1 INJECTION, POWDER, FOR SOLUTION INTRAMUSCULAR; INTRAVENOUS at 18:00

## 2023-12-22 RX ADMIN — ASPIRIN 81 MG: 81 TABLET, CHEWABLE ORAL at 20:45

## 2023-12-22 RX ADMIN — OXYCODONE 5 MG: 5 TABLET ORAL at 04:59

## 2023-12-22 RX ADMIN — ACETAMINOPHEN 1000 MG: 500 TABLET ORAL at 20:45

## 2023-12-22 RX ADMIN — ONDANSETRON 4 MG: 2 INJECTION INTRAMUSCULAR; INTRAVENOUS at 18:21

## 2023-12-22 NOTE — PROGRESS NOTES
Comprehensive Nutrition Assessment    Type and Reason for Visit:       Nutrition Recommendations/Plan:   Initiate PO diet as medically able, goal of heart healthy Diet   Ensure Plus HP 2x/d (700 kcals, 40g pro)  Please obtain measured BW, only stated in EMR      Malnutrition Assessment:  Malnutrition Status:  Insufficient data (12/22/23 1532)        Nutrition Assessment:    Admitted for patella fx s/p fall, (12/22) ORIF. RD assessment for low BMI, unable to interview as pt away for surgery. NPO at admit, only stated wt in EMR, min hx to assess. Will start ONS for weight gain and increased needs per ERAS, f/u for nutrition hx. Labs: BUN 25, Cr 1.07, GFR 50, Alb 3.2, H/H 10.2/31. 6. Meds reviewed. Nutrition Related Findings:    NFPE deferred, pt out of room. Unknown hx dysphagia. No N/V/D/C, last BM pta. No edema. Wound Type: Surgical Incision (knee)       Current Nutrition Intake & Therapies:    Average Meal Intake: NPO  Average Supplements Intake: NPO  Diet NPO  ADULT ORAL NUTRITION SUPPLEMENT; Breakfast, Dinner; Standard High Calorie/High Protein Oral Supplement    Anthropometric Measures:  Height: 165.1 cm (5' 5\")  Ideal Body Weight (IBW): 125 lbs (57 kg)       Current Body Weight: 43.1 kg (95 lb 0.3 oz), 76 % IBW. Weight Source:  Other (Comment) (stated)  Current BMI (kg/m2): 15.8  Usual Body Weight:  (radhames)     Weight Adjustment For: No Adjustment                 BMI Categories: Underweight (BMI less than 22) age over 72    Estimated Daily Nutrient Needs:  Energy Requirements Based On: Kcal/kg  Weight Used for Energy Requirements: Current  Energy (kcal/day): 2968-7413 kcals  (30-35kcals/kg, underweight)  Weight Used for Protein Requirements: Current  Protein (g/day): 65g (1.5g/kg, ERAS, underweight)  Method Used for Fluid Requirements: Other (Comment)  Fluid (ml/day): 1500ml adult min    Nutrition Diagnosis:   Underweight related to  (unknown eitiology) as evidenced by BMI    Nutrition Interventions:   Food

## 2023-12-22 NOTE — PLAN OF CARE
Problem: Pain  Goal: Verbalizes/displays adequate comfort level or baseline comfort level  12/22/2023 0144 by Samreen Albrecht RN  Outcome: Progressing  12/21/2023 1742 by Blaze Sims RN  Outcome: Progressing     Problem: Safety - Adult  Goal: Free from fall injury  Outcome: Progressing

## 2023-12-22 NOTE — OP NOTE
Operative Note      Patient: Ni Gambino  YOB: 1934  MRN: [id number]    Date of Procedure: 12/22/2023    Pre-Op Diagnosis Codes:     * Closed displaced transverse fracture of left patella, initial encounter [S82.032A]    Post-Op Diagnosis: Same       Procedure(s):  PATELLA OPEN REDUCTION INTERNAL FIXATION ON LEFT    Surgeon(s):  Bairon Mosley MD    Assistant:   Surgical Assistant: Charly Lawler    Anesthesia: General    Estimated Blood Loss (mL): less than 50     Complications: None    Specimens:   * No specimens in log *    Implants:  Implant Name Type Inv. Item Serial No.  Lot No. LRB No. Used Action   SCREW BNE L32MM DIA4MM BLNT TIP LO PROF [employer]ARG[employer] LAG - SN/A  SCREW BNE L32MM DIA4MM BLNT TIP LO PROF VARGAS LAG N/A [employer] N/A Left 1 Implanted   SCREW BNE L34MM DIA4MM VARGAS BLNT TIP LO PROF FOR PAT FRAC - SN/A  SCREW BNE L34MM DIA4MM VARGAS BLNT TIP LO PROF FOR PAT FRAC N/A [employer] N/A Left 1 Implanted         Drains:   External Urinary Catheter (Active)   Site Assessment Clean; Intact 12/22/23 0844   Placement Repositioned 12/22/23 0844   Perineal Care Yes 12/22/23 0844   Urine Color Yellow 12/22/23 0844   Urine Appearance Hazy 12/22/23 0505   Urine Odor Malodorous 12/22/23 0505   Output (mL) 400 mL 12/22/23 0505       Findings: Displaced left transverse patella fracture with distal comminution, osteoporotic bone      Indications for the Procedure:  79 yo F with left knee transverse  displaced patella fracture with  stepoff/displacement at articular surface. Discussed diagnosis with patient and treatment options. Recommend send surgery with left patella ORIF.   The risks, benefits and alternatives of the procedure were discussed with the patient including the risk of infection, bleeding, damage to surrounding nerves, tendons, blood vessels, need for further procedures, stiffness, chronic pain, wound healing issues, painful hardware, blood clots, nonunion, malunion,

## 2023-12-22 NOTE — CARE COORDINATION
CM reviewed chart. Patient planned for surgery today. Will need PT/OT when appropriate for safe discharge plan.

## 2023-12-22 NOTE — PROGRESS NOTES
Talked to Dr. Silke Davis, surgery will be done tomorrow and patient can resume regular diet now, to keep NPO again at midnight.

## 2023-12-22 NOTE — PROGRESS NOTES
Informed Dr. Trevor Vilchis that patient still in pain despite of all the pain medications that were given last night. He doesn't want to start with IV pain since it will not stay on her system for long    Telephonic order for additional Roxicodone 5mg PO now.

## 2023-12-22 NOTE — ANESTHESIA PROCEDURE NOTES
Spinal Block    Patient location during procedure: OR  End time: 12/22/2023 2:45 PM  Reason for block: primary anesthetic  Staffing  Anesthesiologist: Angelo Pinto MD  Resident/CRNA: SWATI Vogt CRNA  Performed by: SWATI Vogt CRNA  Authorized by:  Angelo Pinto MD    Spinal Block  Patient position: sitting  Prep: Betadine  Patient monitoring: cardiac monitor, continuous pulse ox, continuous capnometry, frequent blood pressure checks and oxygen  Approach: midline  Location: L3/L4  Provider prep: mask and sterile gloves  Local infiltration: lidocaine  Needle  Needle type: Pencan   Needle gauge: 25 G  Needle length: 3.5 in  Assessment  Sensory level: T8  Swirl obtained: Yes  CSF: clear  Attempts: 2  Hemodynamics: stable  Preanesthetic Checklist  Completed: patient identified, IV checked, site marked, risks and benefits discussed, surgical/procedural consents, equipment checked, pre-op evaluation, timeout performed, anesthesia consent given, oxygen available, monitors applied/VS acknowledged, fire risk safety assessment completed and verbalized and blood product R/B/A discussed and consented

## 2023-12-23 VITALS
WEIGHT: 95 LBS | TEMPERATURE: 98.2 F | BODY MASS INDEX: 15.83 KG/M2 | HEART RATE: 74 BPM | HEIGHT: 65 IN | SYSTOLIC BLOOD PRESSURE: 142 MMHG | DIASTOLIC BLOOD PRESSURE: 67 MMHG | RESPIRATION RATE: 16 BRPM | OXYGEN SATURATION: 99 %

## 2023-12-23 LAB
ALBUMIN SERPL-MCNC: 3 G/DL (ref 3.5–5)
ALBUMIN/GLOB SERPL: 0.9 (ref 1.1–2.2)
ALP SERPL-CCNC: 60 U/L (ref 45–117)
ALT SERPL-CCNC: 20 U/L (ref 12–78)
ANION GAP SERPL CALC-SCNC: 8 MMOL/L (ref 5–15)
AST SERPL W P-5'-P-CCNC: 21 U/L (ref 15–37)
BILIRUB SERPL-MCNC: 0.4 MG/DL (ref 0.2–1)
BUN SERPL-MCNC: 17 MG/DL (ref 6–20)
BUN/CREAT SERPL: 16 (ref 12–20)
CA-I BLD-MCNC: 8.2 MG/DL (ref 8.5–10.1)
CHLORIDE SERPL-SCNC: 110 MMOL/L (ref 97–108)
CO2 SERPL-SCNC: 25 MMOL/L (ref 21–32)
CREAT SERPL-MCNC: 1.04 MG/DL (ref 0.55–1.02)
ERYTHROCYTE [DISTWIDTH] IN BLOOD BY AUTOMATED COUNT: 14.6 % (ref 11.5–14.5)
GLOBULIN SER CALC-MCNC: 3.3 G/DL (ref 2–4)
GLUCOSE SERPL-MCNC: 97 MG/DL (ref 65–100)
HCT VFR BLD AUTO: 31.7 % (ref 35–47)
HGB BLD-MCNC: 10.1 G/DL (ref 11.5–16)
MCH RBC QN AUTO: 30.3 PG (ref 26–34)
MCHC RBC AUTO-ENTMCNC: 31.9 G/DL (ref 30–36.5)
MCV RBC AUTO: 95.2 FL (ref 80–99)
NRBC # BLD: 0 K/UL (ref 0–0.01)
NRBC BLD-RTO: 0 PER 100 WBC
PLATELET # BLD AUTO: 102 K/UL (ref 150–400)
PMV BLD AUTO: 11.5 FL (ref 8.9–12.9)
POTASSIUM SERPL-SCNC: 3.7 MMOL/L (ref 3.5–5.1)
PROT SERPL-MCNC: 6.3 G/DL (ref 6.4–8.2)
RBC # BLD AUTO: 3.33 M/UL (ref 3.8–5.2)
SODIUM SERPL-SCNC: 143 MMOL/L (ref 136–145)
WBC # BLD AUTO: 7.4 K/UL (ref 3.6–11)

## 2023-12-23 PROCEDURE — 6370000000 HC RX 637 (ALT 250 FOR IP): Performed by: ORTHOPAEDIC SURGERY

## 2023-12-23 PROCEDURE — 99024 POSTOP FOLLOW-UP VISIT: CPT | Performed by: ORTHOPAEDIC SURGERY

## 2023-12-23 PROCEDURE — 2580000003 HC RX 258: Performed by: ORTHOPAEDIC SURGERY

## 2023-12-23 PROCEDURE — 2580000003 HC RX 258: Performed by: FAMILY MEDICINE

## 2023-12-23 PROCEDURE — 6360000002 HC RX W HCPCS: Performed by: ORTHOPAEDIC SURGERY

## 2023-12-23 PROCEDURE — 80053 COMPREHEN METABOLIC PANEL: CPT

## 2023-12-23 PROCEDURE — 36415 COLL VENOUS BLD VENIPUNCTURE: CPT

## 2023-12-23 PROCEDURE — 92610 EVALUATE SWALLOWING FUNCTION: CPT

## 2023-12-23 PROCEDURE — 97165 OT EVAL LOW COMPLEX 30 MIN: CPT

## 2023-12-23 PROCEDURE — 97530 THERAPEUTIC ACTIVITIES: CPT

## 2023-12-23 PROCEDURE — 85027 COMPLETE CBC AUTOMATED: CPT

## 2023-12-23 PROCEDURE — 97116 GAIT TRAINING THERAPY: CPT

## 2023-12-23 PROCEDURE — 97161 PT EVAL LOW COMPLEX 20 MIN: CPT

## 2023-12-23 RX ADMIN — ASPIRIN 81 MG: 81 TABLET, CHEWABLE ORAL at 08:21

## 2023-12-23 RX ADMIN — OXYCODONE 5 MG: 5 TABLET ORAL at 05:09

## 2023-12-23 RX ADMIN — CEFAZOLIN 2000 MG: 1 INJECTION, POWDER, FOR SOLUTION INTRAMUSCULAR; INTRAVENOUS at 02:38

## 2023-12-23 RX ADMIN — ACETAMINOPHEN 1000 MG: 500 TABLET ORAL at 13:21

## 2023-12-23 RX ADMIN — SODIUM CHLORIDE, PRESERVATIVE FREE 10 ML: 5 INJECTION INTRAVENOUS at 08:27

## 2023-12-23 RX ADMIN — ACETAMINOPHEN 1000 MG: 500 TABLET ORAL at 05:09

## 2023-12-23 RX ADMIN — SODIUM CHLORIDE: 9 INJECTION, SOLUTION INTRAVENOUS at 05:13

## 2023-12-23 RX ADMIN — CEFAZOLIN 2000 MG: 1 INJECTION, POWDER, FOR SOLUTION INTRAMUSCULAR; INTRAVENOUS at 08:21

## 2023-12-23 NOTE — PLAN OF CARE
Speech LAnguage Pathology Dysphagia EVALUATION    Patient: Moises Esparza (80 y.o. 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]  Closed displaced fracture of left patella, unspecified fracture morphology, initial encounter [S82.002A]  Closed sleeve fracture of left patella, initial encounter [S82.092A]  Procedure(s) (LRB):  PATELLA OPEN REDUCTION INTERNAL FIXATION ON LEFT (Left) 1 Day Post-Op   Precautions: Aspiration, Fall Risk, Bed Alarm, Weight Bearing   Left Lower Extremity Weight Bearing: Weight Bearing As Tolerated            DIET RECOMMENDATIONS: Regular and thin liquids, Meds crushed in puree    SWALLOW SAFETY PRECAUTIONS: Upright positioning during po intake and after po intake for at least 30-60 minutes, slow rate, small-single sips and bites, Upright positioning during po intake and after po intake for at least 30-60 minutes, slow rate, small-single sips and bites   ASSESSMENT :  Patient seen upright in bed, declines to transition to bedside chair as rec by PT services. Patient reports she dislikes meds and straws. Rn reports patient choking with meds last night. Upon inspection: missing a few teeth, adequate otherwise  PO trials: hard solids and thin liquids via cup  Based on the objective data described below, the patient presents with minimal oropharyngeal phase dysphagia characterized by the following  Oral phase: prolonged mastication, adequate clearance of oral cavity  Pharyngeal phase: swallow initiation min delayed and HLE WFL. Overt s/sx of aspiration/penetration: none    Concerns: medications whole, patient agreeable to meds crushed in puree. Problem: SLP Adult - Impaired Swallowing  Goal: By Discharge: Advance to least restrictive diet without signs or symptoms of aspiration for planned discharge setting. See evaluation for individualized goals.   Description: Speech Therapy Swallow Goals    Initiated 12/23/2023    -Patient stated goal: Go home    -Patient will Independent  Homemaking Responsibilities: Yes  Ambulation Assistance: Independent  Transfer Assistance: Independent  Active : Yes  Occupation: Retired     Start of Session End of Session   Heart Rate 72 72   SPO2 99 99       Baseline Assessment:  Current Diet : Regular  Current Liquid Diet : Thin  Prior Dysphagia History: Denies  Patient Complaint: Denies    General:   Comments: Per Rn note, difficulty with meds  Subjective: Im fine, ready to go home  O2 Device: None (Room air)     Current Diet : Regular  Current Liquid Diet : Thin  Dentition: Adequate; Some missing teeth  Patient Positioning: Upright in bed  Prior Dysphagia History: Denies  Patient Complaint: Denies    Cognitive and Communication Status:  Neurologic State: Alert  Orientation Level: Oriented x4  Cognition: Appropriate decision making    Dysphagia:  Oral Assessment:  Oral Motor   Labial: No impairment  Dentition: Natural  Oral Hygiene: Moist  Oral Hygiene Comments: adequate  Lingual: No impairment  Velum: No Impairment; Unable to visualize  Mandible: No impairment  Voice:  WFL  After Treatment:  Patient left in no apparent distress in bed and Nursing notified     Pain:  VAS (numerical) 0/10    COMMUNICATION/EDUCATION:   Swallow safety precautions, Aspiration precautions, Diet recommendations, Energy conservation , and Compensatory strategies provided to Patient and Nurse via explanation, all questions/concerns addressed, requires reinforcement. The patient's plan of care including recommendations, planned interventions, and recommended diet changes were discussed with: Registered nurse.     Patient/family have participated as able in goal setting and plan of care    Thank you,  Carlus Bosworth, M.S. Romona Pucker  Minutes: 10

## 2023-12-23 NOTE — PLAN OF CARE
PHYSICAL THERAPY EVALUATION  Patient: Claudean Latina (68 y.o. 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]  Closed displaced fracture of left patella, unspecified fracture morphology, initial encounter [S82.002A]  Closed sleeve fracture of left patella, initial encounter [S82.092A]  Procedure(s) (LRB):  PATELLA OPEN REDUCTION INTERNAL FIXATION ON LEFT (Left) 1 Day Post-Op   Precautions: Fall Risk, Bed Alarm, Weight Bearing   Left Lower Extremity Weight Bearing: Weight Bearing As Tolerated            Recommendations for nursing mobility: Out of bed to chair for meals, AD and gt belt for bed to chair , Amb to bathroom with AD and gait belt, Assist x1, and weight bearing as tolerated on L LE    In place during session: Peripheral IV    ASSESSMENT  Pt is a 80 y.o. female admitted on 12/21/2023 for s/p fall at home with fracture of L patella now s/p L patella ORIF POD1; currently presents to PT with decreased bed mob, transfers, LE strength, gt, balance, activity tolerance, and overall functional mobility . Pt semi supine in bed upon PT arrival, agreeable to evaluation. Pt A&O x 4. Pt is WBAT L LE with L LE in knee immob. Based on the objective data described below, the patient currently presents with impaired functional mobility, decreased ROM, impaired strength, decreased activity tolerance, impaired balance, and increased pain levels. (See below for objective details and assist levels). Overall pt tolerated session fair today with overall min A with bed mob and CGA/min A with OOB transfers and ambulation in room with RW. Pt was able to amb approx 20ft with RW and CGA, no LOB. Pt amb with knee immob on and WBAT on L LE, no c/o inc pain during session, however it was noted pt does place inc weight through UEs onto walker during amb to lessen amount of weight put through L LE. Pt was very independent and active prior to admission and hopes to return to previous level of function soon. AD, but has rollator/cane if needed)  Receives Help From: Family, Friend(s)  ADL Assistance: Independent  Homemaking Assistance: Independent  Homemaking Responsibilities: Yes  Ambulation Assistance: Independent  Transfer Assistance: Independent  Active : Yes  Occupation: Retired    Cognitive/Behavioral Status:  Orientation  Overall Orientation Status: Within Normal Limits  Orientation Level: Oriented X4  Cognition  Arousal/Alertness: Appropriate responses to stimuli  Following Commands: Follows all commands without difficulty  Attention Span: Appears intact  Memory: Appears intact  Safety Judgement: Good awareness of safety precautions  Problem Solving: Decreased awareness of errors  Insights: Fully aware of deficits  Initiation: Requires cues for all  Sequencing: Requires cues for all    Skin: scrape noted under R eye    Edema: none noted    Strength:    Strength: Generally decreased, functional   R LE grossly 4-/5, L LE NT    Range Of Motion:  AROM: Generally decreased, functional    R LE WFL, L LE NT            Tone & Sensation:       Intact to LT B LE            Functional Mobility:  Bed Mobility:     Bed Mobility Training  Bed Mobility Training: Yes  Overall Level of Assistance: Minimum assistance  Rolling: Minimum assistance  Supine to Sit: Minimum assistance    Transfers:     Transfer Training  Transfer Training: Yes  Interventions: Verbal cues; Safety awareness training  Sit to Stand: Contact-guard assistance;Minimum assistance  Stand to Sit: Contact-guard assistance;Minimum assistance  Stand Pivot Transfers: Contact-guard assistance;Minimum assistance  Toilet Transfer: Contact-guard assistance;Minimum assistance    Balance:     Balance  Sitting: Intact  Standing: Impaired  Standing - Static: Constant support;Good  Standing - Dynamic: Constant support; Fair    Ambulation/Gait Training:     Gait Training: Yes     Left Side Weight Bearing: As tolerated (with knee immob on)    Gait  Overall Level of

## 2023-12-23 NOTE — CARE COORDINATION
Transition of Care Plan:    RUR: 13%  Prior Level of Functioning: independent  Disposition: IRF  If SNF or IPR: Date FOC offered: 12/23/23  Date [address] received: 12/23/23  Accepting facility: [facility] IRF  Date authorization started with reference number: n/a  Date authorization received and expires: n/a  Follow up appointments: to be scheduled once discharged from [facility] needed: at rehab facility   Transportation at discharge: Son to transport  IM/IMM Medicare/ letter given: n/a  Is patient a  and connected with VA? If yes, was [employer] transfer form completed and [facility] notified? Caregiver Contact: Patient contacting son  Discharge Caregiver contacted prior to discharge? Patient contacting  Care Conference needed? no  Barriers to discharge: none    Therapy recommended IRF. CM met with the patient at the bedside to discuss further. She had some hesitation but was agreeable. Referral sent to [facility] IRF. Patient has been accepted. She is waiting to speak with her son to see if he can transport her there. Nursing report can be called to [phone].

## 2023-12-23 NOTE — PLAN OF CARE
OCCUPATIONAL THERAPY EVALUATION  Patient: Sheldon Hardwick (70 y.o. 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]  Closed displaced fracture of left patella, unspecified fracture morphology, initial encounter [S82.002A]  Closed sleeve fracture of left patella, initial encounter [S82.092A]  Procedure(s) (LRB):  PATELLA OPEN REDUCTION INTERNAL FIXATION ON LEFT (Left) 1 Day Post-Op   Precautions: Fall Risk, Bed Alarm, Weight Bearing   Left Lower Extremity Weight Bearing: Weight Bearing As Tolerated            Recommendations for nursing mobility: Out of bed to chair for meals, Encourage HEP in prep for ADLs/mobility; see handout for details, Frequent repositioning to prevent skin breakdown, Use of bed/chair alarm for safety, AD and gt belt for bed to chair , Amb to bathroom with AD and gait belt, and Assist x1    In place during session:Peripheral IV, External Catheter, and EKG/telemetry   ASSESSMENT  Pt is a 80 y.o. female presenting to [facility] with c/o GLF and L knee pain, admitted 12/21/23 and currently being treated for mildly displaced patella fracture with associated joint effusion, dehydration, and BRIANNE. Pt is currently s/p ORIF L patella completed 12/22/23 by Dr. Arvind Mccann. Per medical chart, pt is WBAT LLE with knee immobilizer donned. Pt received ambulating in room with PT upon arrival, AXO x4, and agreeable to OT evaluation. Based on current observations, pt presents with decreased  functional mobility, independence in ADLs, high-level IADLs, ROM, strength, body mechanics, activity tolerance, endurance, safety awareness, balance, posture (see below for objective details and assist levels). Overall, pt tolerates session fair with no c/o pain. Pt able to complete OOB functional transfers/mobility around room and to/from the bathroom using RW with CGA-min A and additional time. Pt demo'd good recall of stepping with LLE first without cuing throughout functional mobility.  Toileting Independent  Active : Yes  Occupation: Retired    Hand Dominance: right     EXAMINATION OF PERFORMANCE DEFICITS:    Cognitive/Behavioral Status:  Orientation  Overall Orientation Status: Within Normal Limits  Orientation Level: Oriented X4  Cognition  Arousal/Alertness: Appropriate responses to stimuli  Following Commands: Follows all commands without difficulty  Attention Span: Appears intact  Memory: Appears intact  Safety Judgement: Good awareness of safety precautions  Problem Solving: Decreased awareness of errors  Insights: Fully aware of deficits  Initiation: Requires cues for all  Sequencing: Requires cues for all    Range of Motion:   AROM: Generally decreased, functional    Strength:  Strength: Generally decreased, functional    Coordination:  Coordination: Within functional limits     Tone & Sensation:   Tone: Normal      Functional Mobility and Transfers for ADLs:  Bed Mobility:  Bed Mobility Training  Bed Mobility Training: No    Transfers:  Transfer Training  Transfer Training: Yes  Interventions: Verbal cues; Safety awareness training  Sit to Stand: Contact-guard assistance;Minimum assistance  Stand to Sit: Contact-guard assistance;Minimum assistance  Toilet Transfer: Contact-guard assistance;Minimum assistance      Balance:  Balance  Sitting: Intact  Standing: Impaired  Standing - Static: Constant support;Good  Standing - Dynamic: Constant support; Fair      ADL Assessment:   Feeding: Independent    Grooming: Contact guard assistance  Grooming Skilled Clinical Factors: CGA to wash hands while standing at sink    Toileting: Stand by assistance  Toileting Skilled Clinical Factors: SBA for narcisa care while seated on toilet    Functional Measure:    Elder VICKERS \"6 Clicks\"                                                       Daily Activity Inpatient Short Form  How much help from another person does the patient currently need. .. Total; A Lot A Little None   1.   Putting on and taking off

## 2023-12-23 NOTE — PROGRESS NOTES
Pt choked while taking tablets and complained it was difficult to swallow,  had to crush other tablets. Consulted speech for evaluation and treatment.

## 2023-12-23 NOTE — DISCHARGE SUMMARY
Discharge Summary       PATIENT ID: Jean-Paul Leon  MRN: [id number]   YOB: 1934    DATE OF ADMISSION: 12/21/2023   DATE OF DISCHARGE:   PRIMARY CARE PROVIDER: [unfilled]      ATTENDING PHYSICIAN: Ofelia Mosley  DISCHARGING PROVIDER: Ofelia Mosley      CONSULTATIONS: IP CONSULT TO ORTHOPEDIC SURGERY  IP CONSULT TO PHYSICAL THERAPY  IP CONSULT TO OCCUPATIONAL THERAPY    PROCEDURES/SURGERIES: Procedure(s):  PATELLA OPEN REDUCTION INTERNAL FIXATION ON LEFT    ADMITTING DIAGNOSES:    Patient Active Problem List    Diagnosis Date Noted    Closed displaced fracture of left patella, unspecified fracture morphology, initial encounter 12/21/2023    Patella dysplasia 12/21/2023       DISCHARGE DIAGNOSES / PLAN:      Mildly displaced mid patella fracture with associated joint effusion   Dehydration  Acute kidney injury:     PATELLA OPEN REDUCTION INTERNAL FIXATION ON LEFT         DISCHARGE MEDICATIONS:     Medication List        START taking these medications      acetaminophen 500 MG tablet  Commonly known as: TYLENOL  Take 1 tablet by mouth every 6 hours as needed for Pain     aspirin 81 MG EC tablet  Take 1 tablet by mouth in the morning and at bedtime Take twice daily for 30 days to prevent blood clots     ondansetron 4 MG disintegrating tablet  Commonly known as: ZOFRAN-ODT  Place 1 tablet under the tongue every 8 hours as needed for Nausea or Vomiting     sennosides-docusate sodium 8.6-50 MG tablet  Commonly known as: SENOKOT-S  Take 1 tablet by mouth 2 times daily as needed for Constipation     traMADol 50 MG tablet  Commonly known as: ULTRAM  Take 1 tablet by mouth every 6 hours as needed for Pain for up to 7 days.  Max Daily Amount: 200 mg            STOP taking these medications      HYDROcodone-acetaminophen 5-325 MG per tablet  Commonly known as: Norco               Where to Get Your Medications        These medications were sent to [address] [place] [address] [phone] [facility] [phone]  [address] [place] [address] Drive      Phone: [phone]   acetaminophen 500 MG tablet  aspirin 81 MG EC tablet  ondansetron 4 MG disintegrating tablet  sennosides-docusate sodium 8.6-50 MG tablet  traMADol 50 MG tablet             NOTIFY YOUR PHYSICIAN FOR ANY OF THE FOLLOWING:   Fever over 101 degrees for 24 hours. Chest pain, shortness of breath, fever, chills, nausea, vomiting, diarrhea, change in mentation, falling, weakness, bleeding. Severe pain or pain not relieved by medications. Or, any other signs or symptoms that you may have questions about. DISPOSITION:  x  Home With:   OT  PT  HH  RN       Long term SNF/Inpatient Rehab    Independent/assisted living    Hospice    Other:       PATIENT CONDITION AT DISCHARGE: Stable      PHYSICAL EXAMINATION AT DISCHARGE:  General:          Alert, cooperative, no distress, appears stated age. HEENT:           Atraumatic, anicteric sclerae, pink conjunctivae                          No oral ulcers, mucosa moist, throat clear, dentition fair  Neck:               Supple, symmetrical  Lungs:             Clear to auscultation bilaterally. No Wheezing or Rhonchi. No rales. Chest wall:      No tenderness  No Accessory muscle use. Heart:              Regular  rhythm,  No  murmur   No edema  Abdomen:        Soft, non-tender. Not distended. Bowel sounds normal  Extremities:     No cyanosis. No clubbing,                            Skin turgor normal, Capillary refill normal  Skin:                Not pale. Not Jaundiced  No rashes   Psych:             Not anxious or agitated.   Neurologic:      Alert, moves all extremities, answers questions appropriately and responds to commands     FL LESS THAN 1 HOUR   Final Result           Recent Results (from the past 24 hour(s))   CBC    Collection Time: 12/23/23  5:19 AM   Result Value Ref Range    WBC 7.4 3.6 - 11.0 K/uL    RBC 3.33 (L) 3.80 - 5.20 M/uL    Hemoglobin 10.1 (L) 11.5 - 16.0

## 2023-12-23 NOTE — DISCHARGE INSTRUCTIONS
Discharge Instructions       PATIENT ID: Juliano Monae  MRN: [id number]   YOB: 1934    DATE OF ADMISSION: 12/21/2023  DATE OF DISCHARGE: 12/23/2023    PRIMARY CARE PROVIDER: [unfilled]     ATTENDING PHYSICIAN: Josh Arriaza MD   DISCHARGING PROVIDER: Josh Arriaza MD    To contact this individual call [phone] and ask the  to page. If unavailable, ask to be transferred the Adult Hospitalist Department. DISCHARGE DIAGNOSES patella fracture    CONSULTATIONS: [unfilled]      PROCEDURES/SURGERIES: Procedure(s):  PATELLA OPEN REDUCTION INTERNAL FIXATION ON LEFT    PENDING TEST RESULTS:   At the time of discharge the following test results are still pending: None    FOLLOW UP APPOINTMENTS:   Josh Arriaza MD  [address] [facility]  [phone]    Schedule an appointment as soon as possible for a visit in 1 week(s)      Spike Caba MD  [address] [place], [place] [address] [place] [address] [address] [facility]  [phone]    Schedule an appointment as soon as possible for a visit in 1 week(s)         ADDITIONAL CARE RECOMMENDATIONS: Follow-up with orthopedics    DIET: Resume previous diet      ACTIVITY: Skilled care rehab    Wound care: {Discharge Wound Care Instructions:11533}    EQUIPMENT needed: ***      DISCHARGE MEDICATIONS:   See Medication Reconciliation Form    It is important that you take the medication exactly as they are prescribed. Keep your medication in the bottles provided by the pharmacist and keep a list of the medication names, dosages, and times to be taken in your wallet. Do not take other medications without consulting your doctor. NOTIFY YOUR PHYSICIAN FOR ANY OF THE FOLLOWING:   Fever over 101 degrees for 24 hours. Chest pain, shortness of breath, fever, chills, nausea, vomiting, diarrhea, change in mentation, falling, weakness, bleeding. Severe pain or pain not relieved by medications.   Or, any other signs or symptoms that you may have questions about.       DISPOSITION:    Home With:   OT  PT  HH  RN       SNF/Inpatient Rehab/LTAC    Independent/assisted living    Hospice    Other:         PROBLEM LIST Updated:  Yes ***       Signed:   Baudilio Tellez MD  12/23/2023  11:56 AM

## 2023-12-24 NOTE — PLAN OF CARE
perform toilet transfers with Modified [place]  within 7 day(s). 5.  Patient will perform all aspects of toileting with [place] within 7 day(s). 6.  Patient will participate in upper extremity therapeutic exercise/activities with [place] within 7 day(s). 12/23/2023 1048 by Lucretia Johnston OT  Outcome: Progressing     Problem: Physical Therapy - Adult  Goal: By Discharge: Performs mobility at highest level of function for planned discharge setting. See evaluation for individualized goals.   Description: FUNCTIONAL STATUS PRIOR TO ADMISSION: Patient was independent and active without use of DME, but has a rollator/cane if needed    [address] [facility]: lives alone, family provides support if needed    Physical Therapy Goals  Initiated 12/23/2023  Pt stated goal: to get better    I with LE HEP x7 days  SBA with bed mob x7 days  SBA with all transfers x7 days  Amb 50-75ft with RW WBAT on L LE with knee immob x 7 days     12/23/2023 1104 by Hamida Rios, PT  Outcome: Progressing

## 2023-12-24 NOTE — PLAN OF CARE
Problem: Discharge Planning  Goal: Discharge to home or other facility with appropriate resources  Outcome: Adequate for Discharge     Problem: Pain  Goal: Verbalizes/displays adequate comfort level or baseline comfort level  Outcome: Adequate for Discharge     Problem: Occupational Therapy - Adult  Goal: By Discharge: Performs self-care activities at highest level of function for planned discharge setting. See evaluation for individualized goals. Description: FUNCTIONAL STATUS PRIOR TO ADMISSION:  Pt was independent for ADLs. HOME SUPPORT: The patient lived alone with no local support. Occupational Therapy Goals:  Initiated 12/23/2023  Patient/Family stated goal: Go to bathroom without assist.  1.  Patient will perform lower body dressing with Modified [place] within 7 day(s). 2.  Patient will perform grooming with [place] within 7 day(s). 3.  Patient will perform bathing with Modified [place] within 7 day(s). 4.  Patient will perform toilet transfers with Modified [place]  within 7 day(s). 5.  Patient will perform all aspects of toileting with [place] within 7 day(s). 6.  Patient will participate in upper extremity therapeutic exercise/activities with [place] within 7 day(s).     12/23/2023 1048 by Kassie Lino OT  Outcome: Progressing

## 2023-12-25 ENCOUNTER — HOSPITAL ENCOUNTER (OUTPATIENT)
Facility: HOSPITAL | Age: 88
Setting detail: SPECIMEN
Discharge: HOME | End: 2023-12-28

## 2023-12-25 LAB
APPEARANCE UR: CLEAR
BACTERIA URNS QL MICRO: NEGATIVE /HPF
BILIRUB UR QL: NEGATIVE
COLOR UR: ABNORMAL
EPITH CASTS URNS QL MICRO: ABNORMAL /LPF
GLUCOSE UR STRIP.AUTO-MCNC: 50 MG/DL
HGB UR QL STRIP: ABNORMAL
KETONES UR QL STRIP.AUTO: NEGATIVE MG/DL
LEUKOCYTE ESTERASE UR QL STRIP.AUTO: NEGATIVE
MUCOUS THREADS URNS QL MICRO: ABNORMAL /LPF
NITRITE UR QL STRIP.AUTO: NEGATIVE
PH UR STRIP: 6 (ref 5–8)
PROT UR STRIP-MCNC: 30 MG/DL
RBC #/AREA URNS HPF: ABNORMAL /HPF (ref 0–5)
SP GR UR REFRACTOMETRY: 1.02 (ref 1–1.03)
UROBILINOGEN UR QL STRIP.AUTO: 2 EU/DL (ref 0.1–1)
WBC URNS QL MICRO: ABNORMAL /HPF (ref 0–4)

## 2023-12-25 PROCEDURE — 81001 URINALYSIS AUTO W/SCOPE: CPT

## 2023-12-28 NOTE — PROGRESS NOTES
Physician Progress Note      PATIENT:               Stephenie Marie  CSN #:                  [id number]  :                       1934  ADMIT DATE:       2023 11:28 AM  DISCH DATE:        2023 9:00 PM  RESPONDING  PROVIDER #:        Larisa Pinto MD          QUERY TEXT:    Pt admitted with Left patella fx after a trip and fall. Pt noted to have per   OP Note findings \"osteoporotic bone. \" If possible, please document if you are   evaluating and/or treating any of the following: The medical record reflects the following:  Risk Factors: 81 y/o, trip and fall, osteoporotic bone    Clinical Indicators:  per OP Note Findings: \"Displaced left transverse patella fracture with distal   comminution, osteoporotic bone\"    Treatment: Patella ORIF, x-ray, knee immobilizer    Thank you,  Robbi AVITIA, RN, CRCR  Please contact me for any questions or concerns regarding this query at   [email or web address]  Options provided:  -- Osteoporotic Patella fracture following fall which would not usually break   a normal, healthy bone  -- Traumatic Patella fracture  -- Other - I will add my own diagnosis  -- Disagree - Not applicable / Not valid  -- Disagree - Clinically unable to determine / Unknown  -- Refer to Clinical Documentation Reviewer    PROVIDER RESPONSE TEXT:    This patient has an osteoporotic Patella fracture following fall which would   not break a normal, healthy bone. Query created by: Robbi Jain on 2023 8:30 AM      QUERY TEXT:    Patient admitted with Patella Fx. Noted documentation of Acute Kidney Injury   in progress notes and DC Summary with Cr 1.10-> 1.07-> 1.04 and no baseline   noted. In order to support the diagnosis of BRIANNE, please include additional   clinical indicators in your documentation. ? Or please document if the   diagnosis of BRIANNE has been ruled out after further study.     The medical record reflects the following:  Risk Factors: 81 y/o, patella fx    Clinical

## 2024-01-09 NOTE — PROGRESS NOTES
is here for a follow up visit after undergoing Patella Open Reduction Internal Fixation On Left - Left on 12/22/2023 by myself.    Pain has been appropriate since surgery, no major medical complications since surgery. Patient reports pain is controlled on tylenol.    The patient denies fevers, chills, nausea, vomiting, numbness/paresthesias.  The patient has been following the weight bearing precautions: WBAT LLE in KI with walker.  The patient has started  physical therapy.     Current Outpatient Medications on File Prior to Visit   Medication Sig Dispense Refill    aspirin 81 MG EC tablet Take 1 tablet by mouth in the morning and at bedtime Take twice daily for 30 days to prevent blood clots 60 tablet 0    sennosides-docusate sodium (SENOKOT-S) 8.6-50 MG tablet Take 1 tablet by mouth 2 times daily as needed for Constipation 30 tablet 0    ondansetron (ZOFRAN-ODT) 4 MG disintegrating tablet Place 1 tablet under the tongue every 8 hours as needed for Nausea or Vomiting 10 tablet 1    acetaminophen (TYLENOL) 500 MG tablet Take 1 tablet by mouth every 6 hours as needed for Pain 40 tablet 1     No current facility-administered medications on file prior to visit.       ROS:  General: denies agitation, fevers/chills  Cardiac: denies major chest pain  Gastrointestinal: denies nausea/vomiting  Neurologic: Denies numbness/paresthesias  Skin: Denies erythema, warmth to touch, active drainage  Musculoskeletal: Endorses appropriate pain at surgical site      Physical Examination:    There were no vitals taken for this visit.    GENERAL: Patient is a healthy-appearing and in no apparent distress. Afebrile, normotensive, regular rate  MENTAL STATUS: Alert and oriented to time, place, person; mood and affect normal.  PULMONARY: Respiratory rate normal and non-labored on room air.  GASTROINTESTINAL: Nondistended abdomen  CARDIAC: Regular rate and rhythm. Without pitting edema of affected extremity and peripheral

## 2024-01-12 ENCOUNTER — OFFICE VISIT (OUTPATIENT)
Age: 89
End: 2024-01-12

## 2024-01-12 VITALS — HEIGHT: 62 IN | BODY MASS INDEX: 17.48 KG/M2 | WEIGHT: 95 LBS

## 2024-01-12 DIAGNOSIS — S82.002A CLOSED DISPLACED FRACTURE OF LEFT PATELLA, UNSPECIFIED FRACTURE MORPHOLOGY, INITIAL ENCOUNTER: Primary | ICD-10-CM

## 2024-01-12 ASSESSMENT — PATIENT HEALTH QUESTIONNAIRE - PHQ9
SUM OF ALL RESPONSES TO PHQ QUESTIONS 1-9: 0
1. LITTLE INTEREST OR PLEASURE IN DOING THINGS: 0
SUM OF ALL RESPONSES TO PHQ QUESTIONS 1-9: 0
2. FEELING DOWN, DEPRESSED OR HOPELESS: 0
SUM OF ALL RESPONSES TO PHQ QUESTIONS 1-9: 0
SUM OF ALL RESPONSES TO PHQ9 QUESTIONS 1 & 2: 0
SUM OF ALL RESPONSES TO PHQ QUESTIONS 1-9: 0

## 2024-01-16 DIAGNOSIS — S82.042D CLOSED DISPLACED COMMINUTED FRACTURE OF LEFT PATELLA WITH ROUTINE HEALING, SUBSEQUENT ENCOUNTER: Primary | ICD-10-CM

## 2024-02-06 NOTE — PROGRESS NOTES
is here for a follow up visit after undergoing Patella Open Reduction Internal Fixation On Left - Left on 12/22/2023 by myself.    Pain has been appropriate since surgery, no major medical complications since surgery. Patient reports pain is controlled on tylenol.    The patient denies fevers, chills, nausea, vomiting, numbness/paresthesias.  The patient has been following the weight bearing precautions: WBAT LLE in TROM brace with cane  The patient has started  physical therapy.     Current Outpatient Medications on File Prior to Visit   Medication Sig Dispense Refill    aspirin 81 MG EC tablet Take 1 tablet by mouth in the morning and at bedtime Take twice daily for 30 days to prevent blood clots 60 tablet 0    sennosides-docusate sodium (SENOKOT-S) 8.6-50 MG tablet Take 1 tablet by mouth 2 times daily as needed for Constipation (Patient not taking: Reported on 1/12/2024) 30 tablet 0    ondansetron (ZOFRAN-ODT) 4 MG disintegrating tablet Place 1 tablet under the tongue every 8 hours as needed for Nausea or Vomiting (Patient not taking: Reported on 1/12/2024) 10 tablet 1    acetaminophen (TYLENOL) 500 MG tablet Take 1 tablet by mouth every 6 hours as needed for Pain 40 tablet 1     No current facility-administered medications on file prior to visit.       ROS:  General: denies agitation, fevers/chills  Cardiac: denies major chest pain  Gastrointestinal: denies nausea/vomiting  Neurologic: Denies numbness/paresthesias  Skin: Denies erythema, warmth to touch, active drainage  Musculoskeletal: Endorses appropriate pain at surgical site      Physical Examination:    There were no vitals taken for this visit.    GENERAL: Patient is a healthy-appearing and in no apparent distress. Afebrile, normotensive, regular rate  MENTAL STATUS: Alert and oriented to time, place, person; mood and affect normal.  PULMONARY: Respiratory rate normal and non-labored on room air.  GASTROINTESTINAL: Nondistended

## 2024-02-09 ENCOUNTER — HOSPITAL ENCOUNTER (OUTPATIENT)
Facility: HOSPITAL | Age: 89
End: 2024-02-09
Payer: MEDICARE

## 2024-02-09 ENCOUNTER — OFFICE VISIT (OUTPATIENT)
Age: 89
End: 2024-02-09

## 2024-02-09 VITALS — WEIGHT: 95 LBS | BODY MASS INDEX: 17.48 KG/M2 | HEIGHT: 62 IN

## 2024-02-09 DIAGNOSIS — S82.002A CLOSED DISPLACED FRACTURE OF LEFT PATELLA, UNSPECIFIED FRACTURE MORPHOLOGY, INITIAL ENCOUNTER: ICD-10-CM

## 2024-02-09 DIAGNOSIS — S82.002A CLOSED DISPLACED FRACTURE OF LEFT PATELLA, UNSPECIFIED FRACTURE MORPHOLOGY, INITIAL ENCOUNTER: Primary | ICD-10-CM

## 2024-02-09 PROCEDURE — 99024 POSTOP FOLLOW-UP VISIT: CPT | Performed by: ORTHOPAEDIC SURGERY

## 2024-02-09 PROCEDURE — 73564 X-RAY EXAM KNEE 4 OR MORE: CPT

## 2024-02-09 RX ORDER — CEPHALEXIN 500 MG/1
500 CAPSULE ORAL 4 TIMES DAILY
Qty: 56 CAPSULE | Refills: 0 | Status: SHIPPED | OUTPATIENT
Start: 2024-02-09 | End: 2024-02-23

## 2024-02-09 ASSESSMENT — PATIENT HEALTH QUESTIONNAIRE - PHQ9
SUM OF ALL RESPONSES TO PHQ QUESTIONS 1-9: 0
SUM OF ALL RESPONSES TO PHQ QUESTIONS 1-9: 0
1. LITTLE INTEREST OR PLEASURE IN DOING THINGS: 0
SUM OF ALL RESPONSES TO PHQ QUESTIONS 1-9: 0
SUM OF ALL RESPONSES TO PHQ QUESTIONS 1-9: 0

## 2024-02-21 NOTE — PROGRESS NOTES
is here for a follow up visit after undergoing Patella Open Reduction Internal Fixation On Left - Left on 12/22/2023 by myself.    Pain has been appropriate since surgery, no major medical complications since surgery. Patient reports pain is controlled on tylenol.    The patient denies fevers, chills, nausea, vomiting, numbness/paresthesias.  The patient has been following the weight bearing precautions: WBAT LLE in TROM brace with cane  The patient is doing  physical therapy. She is here for wound check. 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    Current Outpatient Medications on File Prior to Visit   Medication Sig Dispense Refill    cephALEXin (KEFLEX) 500 MG capsule Take 1 capsule by mouth 4 times daily for 14 days 56 capsule 0    aspirin 81 MG EC tablet Take 1 tablet by mouth in the morning and at bedtime Take twice daily for 30 days to prevent blood clots 60 tablet 0    sennosides-docusate sodium (SENOKOT-S) 8.6-50 MG tablet Take 1 tablet by mouth 2 times daily as needed for Constipation (Patient not taking: Reported on 1/12/2024) 30 tablet 0    ondansetron (ZOFRAN-ODT) 4 MG disintegrating tablet Place 1 tablet under the tongue every 8 hours as needed for Nausea or Vomiting (Patient not taking: Reported on 1/12/2024) 10 tablet 1    acetaminophen (TYLENOL) 500 MG tablet Take 1 tablet by mouth every 6 hours as needed for Pain 40 tablet 1     No current facility-administered medications on file prior to visit.       ROS:  General: denies agitation, fevers/chills  Cardiac: denies major chest pain  Gastrointestinal: denies nausea/vomiting  Neurologic: Denies numbness/paresthesias  Skin: Denies erythema, warmth to touch, active drainage  Musculoskeletal: Endorses appropriate pain at surgical site      Physical Examination:    There were no vitals taken for this visit.    GENERAL: Patient is a healthy-appearing and in no apparent distress. Afebrile, normotensive,

## 2024-02-21 NOTE — ANESTHESIA POSTPROCEDURE EVALUATION
Department of Anesthesiology  Postprocedure Note    Patient: Angelica Jansen  MRN: [id number]  YOB: 1934  Date of evaluation: 2/20/2024    Procedure Summary       Date: 12/22/23 Room / Location: [facility] MAIN OR 06 / [facility] MAIN OR    Anesthesia Start: 1438 Anesthesia Stop: 1625    Procedure: PATELLA OPEN REDUCTION INTERNAL FIXATION ON LEFT (Left: Knee) Diagnosis:       Closed displaced transverse fracture of left patella, initial encounter      (Closed displaced transverse fracture of left patella, initial encounter [S82.032A])    Surgeons: Madalyn Encinas MD Responsible Provider: Phillip Smalls Jr., MD    Anesthesia Type: spinal ASA Status: 1 - Emergent            Anesthesia Type: No value filed.    Carol Phase I: Carol Score: 10    Carol Phase II:      Anesthesia Post Evaluation    Patient location during evaluation: PACU  Patient participation: complete - patient cannot participate  Level of consciousness: awake  Pain score: 0  Airway patency: patent  Nausea & Vomiting: no nausea and no vomiting  Cardiovascular status: hemodynamically unstable  Respiratory status: acceptable  Hydration status: stable  Multimodal analgesia pain management approach        No notable events documented.

## 2024-02-23 ENCOUNTER — OFFICE VISIT (OUTPATIENT)
Age: 89
End: 2024-02-23

## 2024-02-23 DIAGNOSIS — S82.002A CLOSED DISPLACED FRACTURE OF LEFT PATELLA, UNSPECIFIED FRACTURE MORPHOLOGY, INITIAL ENCOUNTER: Primary | ICD-10-CM

## 2024-02-23 DIAGNOSIS — S46.311D TRICEPS STRAIN, RIGHT, SUBSEQUENT ENCOUNTER: ICD-10-CM

## 2024-02-23 PROCEDURE — 99024 POSTOP FOLLOW-UP VISIT: CPT | Performed by: ORTHOPAEDIC SURGERY

## 2024-02-23 ASSESSMENT — PATIENT HEALTH QUESTIONNAIRE - PHQ9
SUM OF ALL RESPONSES TO PHQ QUESTIONS 1-9: 0
2. FEELING DOWN, DEPRESSED OR HOPELESS: 0
SUM OF ALL RESPONSES TO PHQ9 QUESTIONS 1 & 2: 0
SUM OF ALL RESPONSES TO PHQ QUESTIONS 1-9: 0
1. LITTLE INTEREST OR PLEASURE IN DOING THINGS: 0
SUM OF ALL RESPONSES TO PHQ QUESTIONS 1-9: 0
SUM OF ALL RESPONSES TO PHQ QUESTIONS 1-9: 0

## 2024-03-20 ENCOUNTER — FOLLOW UP (OUTPATIENT)
Dept: URBAN - METROPOLITAN AREA CLINIC 67 | Facility: CLINIC | Age: 89
End: 2024-03-20

## 2024-03-20 DIAGNOSIS — H35.3122: ICD-10-CM

## 2024-03-20 DIAGNOSIS — H35.3211: ICD-10-CM

## 2024-03-20 DIAGNOSIS — H43.813: ICD-10-CM

## 2024-03-20 DIAGNOSIS — H35.713: ICD-10-CM

## 2024-03-20 PROCEDURE — HD EYLEA HD 8MG: Mod: JZ

## 2024-03-20 PROCEDURE — 92014 COMPRE OPH EXAM EST PT 1/>: CPT | Mod: 25

## 2024-03-20 PROCEDURE — 92202 OPSCPY EXTND ON/MAC DRAW: CPT | Mod: 59

## 2024-03-20 PROCEDURE — 92134 CPTRZ OPH DX IMG PST SGM RTA: CPT

## 2024-03-20 PROCEDURE — 67028 INJECTION EYE DRUG: CPT

## 2024-03-20 ASSESSMENT — TONOMETRY
OD_IOP_MMHG: 23
OS_IOP_MMHG: 23

## 2024-03-20 ASSESSMENT — VISUAL ACUITY
OD_PH: 20/25-1
OS_SC: 20/25-1
OD_SC: 20/30-1

## 2024-03-21 NOTE — PROGRESS NOTES
regular rate  MENTAL STATUS: Alert and oriented to time, place, person; mood and affect normal.  PULMONARY: Respiratory rate normal and non-labored on room air.  GASTROINTESTINAL: Nondistended abdomen  CARDIAC: Regular rate and rhythm. Without pitting edema of affected extremity and peripheral pulses normal unless otherwise noted.  SKIN: No obvious lacerations or cutaneous disruptions in examined extremity unless otherwise noted.  GAIT: uses assistive device     FOCUSED MUSCULOSKELETAL SYSTEM:  left lower extremity  Incision with eschar.  No active drainage, or surrounding erythema.  Mild swelling  Sensation is intact to light touch in sural, saphenous, deep peroneal, superficial peroneal, and tibial nerve distributions  EHL/FHL/ADF/APF intact   Knee ROM 0-80 deg  DP pulse is palpable with brisk capillary refill  Right upper extremity:   TTP over posterior tricep muscle belly, FE to 150 deg, ER to 50 deg, IR to TL junction    Imaging:  No new imaging      Assessment: 91 days Status post Patella Open Reduction Internal Fixation On Left - Left    Plan:  - Incision with  eschar, instructed not to pick off to let it fall off on its own and heal from inside out  - Weight bearing restrictions: WBAT LLE  - Patient given prescription for outpatient PT for left knee and right shoulder/arm  - Pain control with tylenol  - Patient will try to get OSH x-rays uploaded to our system at [facility] and will call her with results  - Follow up will be at 6 weeks from now,  new left knee xrays on follow up      Electronically signed by Madalyn Encinas MD on 3/21/2024 at 8:20 AM

## 2024-03-22 ENCOUNTER — OFFICE VISIT (OUTPATIENT)
Age: 89
End: 2024-03-22

## 2024-03-22 DIAGNOSIS — S82.002D CLOSED DISPLACED FRACTURE OF LEFT PATELLA WITH ROUTINE HEALING, UNSPECIFIED FRACTURE MORPHOLOGY, SUBSEQUENT ENCOUNTER: Primary | ICD-10-CM

## 2024-03-22 PROCEDURE — 99024 POSTOP FOLLOW-UP VISIT: CPT | Performed by: ORTHOPAEDIC SURGERY

## 2024-04-30 ENCOUNTER — OFFICE VISIT (OUTPATIENT)
Age: 89
End: 2024-04-30

## 2024-04-30 VITALS
OXYGEN SATURATION: 97 % | HEART RATE: 82 BPM | HEIGHT: 62 IN | BODY MASS INDEX: 17.48 KG/M2 | TEMPERATURE: 97.3 F | WEIGHT: 95 LBS | RESPIRATION RATE: 18 BRPM | SYSTOLIC BLOOD PRESSURE: 134 MMHG | DIASTOLIC BLOOD PRESSURE: 74 MMHG

## 2024-04-30 DIAGNOSIS — S82.002D CLOSED DISPLACED FRACTURE OF LEFT PATELLA WITH ROUTINE HEALING, UNSPECIFIED FRACTURE MORPHOLOGY, SUBSEQUENT ENCOUNTER: Primary | ICD-10-CM

## 2024-04-30 PROCEDURE — 99024 POSTOP FOLLOW-UP VISIT: CPT | Performed by: ORTHOPAEDIC SURGERY

## 2024-04-30 ASSESSMENT — PATIENT HEALTH QUESTIONNAIRE - PHQ9
SUM OF ALL RESPONSES TO PHQ QUESTIONS 1-9: 0
1. LITTLE INTEREST OR PLEASURE IN DOING THINGS: NOT AT ALL
SUM OF ALL RESPONSES TO PHQ9 QUESTIONS 1 & 2: 0
2. FEELING DOWN, DEPRESSED OR HOPELESS: NOT AT ALL
SUM OF ALL RESPONSES TO PHQ QUESTIONS 1-9: 0

## 2024-04-30 NOTE — PROGRESS NOTES
Identified pt with two pt identifiers (name and ). Reviewed chart in preparation for visit and have obtained necessary documentation.    Angelica Jansen is a 89 y.o. female  Chief Complaint   Patient presents with    Follow-up     Reason for Visit: left knee fracture  Pain level: 0  Patient states she is still having concerns with her arm.         /74 (Site: Left Upper Arm, Position: Sitting, Cuff Size: Small Adult)   Pulse 82   Temp 97.3 °F (36.3 °C) (Oral)   Resp 18   Ht 1.575 m (5' 2\")   Wt 43.1 kg (95 lb)   SpO2 97%   BMI 17.38 kg/m²     1. Have you been to the ER, urgent care clinic since your last visit?  Hospitalized since your last visit?no    2. Have you seen or consulted any other health care providers outside of the [facility] since your last visit?  Include any pap smears or colon screening. no  
Madalyn Encinas MD on 4/30/2024 at 1:04 PM

## 2024-05-22 ENCOUNTER — FOLLOW UP (OUTPATIENT)
Dept: URBAN - METROPOLITAN AREA CLINIC 67 | Facility: CLINIC | Age: 89
End: 2024-05-22

## 2024-05-22 DIAGNOSIS — G45.3: ICD-10-CM

## 2024-05-22 DIAGNOSIS — H35.3122: ICD-10-CM

## 2024-05-22 DIAGNOSIS — H35.713: ICD-10-CM

## 2024-05-22 DIAGNOSIS — H43.813: ICD-10-CM

## 2024-05-22 DIAGNOSIS — H35.3211: ICD-10-CM

## 2024-05-22 PROCEDURE — 67028 INJECTION EYE DRUG: CPT

## 2024-05-22 PROCEDURE — 92202 OPSCPY EXTND ON/MAC DRAW: CPT | Mod: NC

## 2024-05-22 PROCEDURE — 92250 FUNDUS PHOTOGRAPHY W/I&R: CPT

## 2024-05-22 PROCEDURE — 92134 CPTRZ OPH DX IMG PST SGM RTA: CPT | Mod: NC

## 2024-05-22 PROCEDURE — 92235 FLUORESCEIN ANGRPH MLTIFRAME: CPT

## 2024-05-22 PROCEDURE — 92014 COMPRE OPH EXAM EST PT 1/>: CPT | Mod: 25

## 2024-05-22 ASSESSMENT — VISUAL ACUITY
OD_PH: 20/30-2
OD_SC: 20/50
OS_SC: 20/40+2
OS_PH: 20/30-1

## 2024-05-22 ASSESSMENT — TONOMETRY
OD_IOP_MMHG: 18
OS_IOP_MMHG: 19

## 2024-06-13 ENCOUNTER — OFFICE VISIT (OUTPATIENT)
Age: 89
End: 2024-06-13

## 2024-06-13 VITALS
TEMPERATURE: 97.6 F | DIASTOLIC BLOOD PRESSURE: 81 MMHG | HEIGHT: 62 IN | HEART RATE: 73 BPM | WEIGHT: 95.24 LBS | OXYGEN SATURATION: 95 % | BODY MASS INDEX: 17.53 KG/M2 | SYSTOLIC BLOOD PRESSURE: 156 MMHG

## 2024-06-13 DIAGNOSIS — S82.002D CLOSED DISPLACED FRACTURE OF LEFT PATELLA WITH ROUTINE HEALING, UNSPECIFIED FRACTURE MORPHOLOGY, SUBSEQUENT ENCOUNTER: ICD-10-CM

## 2024-06-13 DIAGNOSIS — M75.41 IMPINGEMENT SYNDROME OF RIGHT SHOULDER: Primary | ICD-10-CM

## 2024-06-13 RX ORDER — LIDOCAINE HYDROCHLORIDE 10 MG/ML
2 INJECTION, SOLUTION EPIDURAL; INFILTRATION; INTRACAUDAL; PERINEURAL ONCE
Status: COMPLETED | OUTPATIENT
Start: 2024-06-13 | End: 2024-06-13

## 2024-06-13 RX ORDER — TRIAMCINOLONE ACETONIDE 40 MG/ML
40 INJECTION, SUSPENSION INTRA-ARTICULAR; INTRAMUSCULAR ONCE
Status: COMPLETED | OUTPATIENT
Start: 2024-06-13 | End: 2024-06-13

## 2024-06-13 RX ADMIN — LIDOCAINE HYDROCHLORIDE 2 ML: 10 INJECTION, SOLUTION EPIDURAL; INFILTRATION; INTRACAUDAL; PERINEURAL at 17:16

## 2024-06-13 RX ADMIN — TRIAMCINOLONE ACETONIDE 40 MG: 40 INJECTION, SUSPENSION INTRA-ARTICULAR; INTRAMUSCULAR at 17:16

## 2024-06-13 ASSESSMENT — PATIENT HEALTH QUESTIONNAIRE - PHQ9
1. LITTLE INTEREST OR PLEASURE IN DOING THINGS: NOT AT ALL
SUM OF ALL RESPONSES TO PHQ QUESTIONS 1-9: 0
2. FEELING DOWN, DEPRESSED OR HOPELESS: NOT AT ALL
SUM OF ALL RESPONSES TO PHQ QUESTIONS 1-9: 0
SUM OF ALL RESPONSES TO PHQ QUESTIONS 1-9: 0
SUM OF ALL RESPONSES TO PHQ9 QUESTIONS 1 & 2: 0
SUM OF ALL RESPONSES TO PHQ QUESTIONS 1-9: 0

## 2024-06-13 NOTE — PROGRESS NOTES
Identified pt with two pt identifiers (name and ). Reviewed chart in preparation for visit and have obtained necessary documentation.    Angelica Jansen is a 89 y.o. female  Chief Complaint   Patient presents with    Fracture     Reason for Visit: F/U Left Leg Fracture  Pain level: 0  Patient states no other concerns at this time          BP (!) 156/81 (Site: Left Upper Arm, Position: Sitting, Cuff Size: Medium Adult)   Pulse 71   Temp 97.6 °F (36.4 °C) (Oral)   Ht 1.575 m (5' 2\")   Wt 43.2 kg (95 lb 3.8 oz)   LMP  (LMP Unknown)   SpO2 95%   BMI 17.42 kg/m²     1. Have you been to the ER, urgent care clinic since your last visit?  Hospitalized since your last visit?no    2. Have you seen or consulted any other health care providers outside of the [facility] since your last visit?  Include any pap smears or colon screening. No  .ward  
on its own and heal from inside out  - Weight bearing restrictions: WBAT LLE  - Follow up will be at 6 months from now,  new left knee xrays on follow up     Electronically signed by Madalyn Encinas MD on 6/13/2024 at 2:22 PM

## 2024-08-27 ENCOUNTER — HOSPITAL ENCOUNTER (OUTPATIENT)
Facility: HOSPITAL | Age: 89
Discharge: HOME | End: 2024-08-30
Payer: MEDICARE

## 2024-08-27 ENCOUNTER — TRANSCRIBE ORDERS (OUTPATIENT)
Facility: HOSPITAL | Age: 89
End: 2024-08-27

## 2024-08-27 DIAGNOSIS — M25.562 ACUTE PAIN OF LEFT KNEE: Primary | ICD-10-CM

## 2024-08-27 DIAGNOSIS — M25.562 ACUTE PAIN OF LEFT KNEE: ICD-10-CM

## 2024-08-27 PROCEDURE — 73562 X-RAY EXAM OF KNEE 3: CPT

## 2024-08-28 ENCOUNTER — TELEPHONE (OUTPATIENT)
Age: 89
End: 2024-08-28

## 2024-08-28 NOTE — TELEPHONE ENCOUNTER
Patient called in wanting to make sure that Dr. Encinas was made aware that she has completed additional xrays of her right knee that are available for review in her chart and that she still has an inch long opening at her surgery site that bleeds periodically. Patient may be reached at [phone].

## 2024-08-29 NOTE — TELEPHONE ENCOUNTER
Placed outbound call to patient and LVM requesting call back to get scheduled for next Thursday 9/5/2024.

## 2024-09-05 ENCOUNTER — OFFICE VISIT (OUTPATIENT)
Age: 89
End: 2024-09-05
Payer: MEDICARE

## 2024-09-05 VITALS
HEART RATE: 81 BPM | RESPIRATION RATE: 16 BRPM | OXYGEN SATURATION: 99 % | WEIGHT: 91.71 LBS | SYSTOLIC BLOOD PRESSURE: 123 MMHG | TEMPERATURE: 98.7 F | DIASTOLIC BLOOD PRESSURE: 58 MMHG | HEIGHT: 62 IN | BODY MASS INDEX: 16.88 KG/M2

## 2024-09-05 DIAGNOSIS — S82.002D CLOSED DISPLACED FRACTURE OF LEFT PATELLA WITH ROUTINE HEALING, UNSPECIFIED FRACTURE MORPHOLOGY, SUBSEQUENT ENCOUNTER: Primary | ICD-10-CM

## 2024-09-05 PROCEDURE — G8427 DOCREV CUR MEDS BY ELIG CLIN: HCPCS | Performed by: ORTHOPAEDIC SURGERY

## 2024-09-05 PROCEDURE — 1036F TOBACCO NON-USER: CPT | Performed by: ORTHOPAEDIC SURGERY

## 2024-09-05 PROCEDURE — 1090F PRES/ABSN URINE INCON ASSESS: CPT | Performed by: ORTHOPAEDIC SURGERY

## 2024-09-05 PROCEDURE — 99213 OFFICE O/P EST LOW 20 MIN: CPT | Performed by: ORTHOPAEDIC SURGERY

## 2024-09-05 PROCEDURE — G8419 CALC BMI OUT NRM PARAM NOF/U: HCPCS | Performed by: ORTHOPAEDIC SURGERY

## 2024-09-05 PROCEDURE — 1123F ACP DISCUSS/DSCN MKR DOCD: CPT | Performed by: ORTHOPAEDIC SURGERY

## 2024-09-05 RX ORDER — SULFAMETHOXAZOLE/TRIMETHOPRIM 800-160 MG
1 TABLET ORAL 2 TIMES DAILY
COMMUNITY

## 2024-09-05 NOTE — PROGRESS NOTES
Chief Complaint   Patient presents with    Follow-up     BP (!) 123/58   Pulse 81   Temp 98.7 °F (37.1 °C)   Resp 16   Ht 1.575 m (5' 2\")   Wt 41.6 kg (91 lb 11.4 oz)   LMP  (LMP Unknown)   SpO2 99%   BMI 16.77 kg/m²   1. Have you been to the ER, urgent care clinic since your last visit?  Hospitalized since your last visit?No    2. Have you seen or consulted any other health care providers outside of the [facility] since your last visit?  Include any pap smears or colon screening. No    
      ROS:  General: denies agitation, fevers/chills  Cardiac: denies major chest pain  Gastrointestinal: denies nausea/vomiting  Neurologic: Denies numbness/paresthesias  Skin: Denies erythema, warmth to touch, active drainage  Musculoskeletal: Endorses appropriate pain at surgical site      Physical Examination:    Resp. rate 16, height 1.575 m (5' 2\"), weight 41.6 kg (91 lb 11.4 oz).    GENERAL: Patient is a healthy-appearing and in no apparent distress. Afebrile, normotensive, regular rate  MENTAL STATUS: Alert and oriented to time, place, person; mood and affect normal.  PULMONARY: Respiratory rate normal and non-labored on room air.  GASTROINTESTINAL: Nondistended abdomen  CARDIAC: Regular rate and rhythm. Without pitting edema of affected extremity and peripheral pulses normal unless otherwise noted.  SKIN: No obvious lacerations or cutaneous disruptions in examined extremity unless otherwise noted.  GAIT: uses assistive device     FOCUSED MUSCULOSKELETAL SYSTEM:  left lower extremity  Incision with eschar with some dried drainage .  No active drainage, mild surrounding erythema.  Sensation is intact to light touch in sural, saphenous, deep peroneal, superficial peroneal, and tibial nerve distributions  EHL/FHL/ADF/APF intact   Knee ROM 0-90 deg  DP pulse is palpable with brisk capillary refill    Imaging:  3 views of the left knee were personally reviewed and interpreted by me and show evidence of 2 cannulated screws in place, the patella fracture line is still slightly visible but the articular surface looks well-maintained.      Assessment: 258 days Status post Patella Open Reduction Internal Fixation On Left - Left     Left knee incision with some purulent drainage and erythema that responded to Bactrim, no worsening of symptoms since she has been off Bactrim    Plan:  -Discussed potential options with the patient.  Can either continue to monitor the eschar over the incision area and if it again develops

## 2024-09-10 ENCOUNTER — TELEPHONE (OUTPATIENT)
Age: 89
End: 2024-09-10

## 2024-09-10 ENCOUNTER — HOSPITAL ENCOUNTER (EMERGENCY)
Facility: HOSPITAL | Age: 89
Discharge: HOME | End: 2024-09-10
Attending: STUDENT IN AN ORGANIZED HEALTH CARE EDUCATION/TRAINING PROGRAM
Payer: MEDICARE

## 2024-09-10 VITALS
WEIGHT: 80 LBS | DIASTOLIC BLOOD PRESSURE: 75 MMHG | SYSTOLIC BLOOD PRESSURE: 151 MMHG | HEART RATE: 73 BPM | TEMPERATURE: 98.4 F | RESPIRATION RATE: 14 BRPM | HEIGHT: 65 IN | BODY MASS INDEX: 13.33 KG/M2 | OXYGEN SATURATION: 99 %

## 2024-09-10 DIAGNOSIS — T81.49XA SURGICAL SITE INFECTION: Primary | ICD-10-CM

## 2024-09-10 LAB
ALBUMIN SERPL-MCNC: 3.9 G/DL (ref 3.5–5)
ALBUMIN/GLOB SERPL: 1 (ref 1.1–2.2)
ALP SERPL-CCNC: 102 U/L (ref 45–117)
ALT SERPL-CCNC: 19 U/L (ref 12–78)
ANION GAP SERPL CALC-SCNC: 6 MMOL/L (ref 2–12)
AST SERPL W P-5'-P-CCNC: 21 U/L (ref 15–37)
BASOPHILS # BLD: 0.1 K/UL (ref 0–0.1)
BASOPHILS NFR BLD: 1 % (ref 0–1)
BILIRUB SERPL-MCNC: 0.5 MG/DL (ref 0.2–1)
BUN SERPL-MCNC: 24 MG/DL (ref 6–20)
BUN/CREAT SERPL: 24 (ref 12–20)
CA-I BLD-MCNC: 9.4 MG/DL (ref 8.5–10.1)
CHLORIDE SERPL-SCNC: 110 MMOL/L (ref 97–108)
CO2 SERPL-SCNC: 24 MMOL/L (ref 21–32)
CREAT SERPL-MCNC: 1.01 MG/DL (ref 0.55–1.02)
DIFFERENTIAL METHOD BLD: NORMAL
EOSINOPHIL # BLD: 0.1 K/UL (ref 0–0.4)
EOSINOPHIL NFR BLD: 2 % (ref 0–7)
ERYTHROCYTE [DISTWIDTH] IN BLOOD BY AUTOMATED COUNT: 14.4 % (ref 11.5–14.5)
GLOBULIN SER CALC-MCNC: 3.9 G/DL (ref 2–4)
GLUCOSE SERPL-MCNC: 99 MG/DL (ref 65–100)
HCT VFR BLD AUTO: 35.3 % (ref 35–47)
HGB BLD-MCNC: 11.5 G/DL (ref 11.5–16)
IMM GRANULOCYTES # BLD AUTO: 0 K/UL (ref 0–0.04)
IMM GRANULOCYTES NFR BLD AUTO: 0 % (ref 0–0.5)
LYMPHOCYTES # BLD: 1.2 K/UL (ref 0.8–3.5)
LYMPHOCYTES NFR BLD: 18 % (ref 12–49)
MCH RBC QN AUTO: 30 PG (ref 26–34)
MCHC RBC AUTO-ENTMCNC: 32.6 G/DL (ref 30–36.5)
MCV RBC AUTO: 92.2 FL (ref 80–99)
MONOCYTES # BLD: 0.6 K/UL (ref 0–1)
MONOCYTES NFR BLD: 9 % (ref 5–13)
NEUTS SEG # BLD: 4.3 K/UL (ref 1.8–8)
NEUTS SEG NFR BLD: 70 % (ref 32–75)
NRBC # BLD: 0 K/UL (ref 0–0.01)
NRBC BLD-RTO: 0 PER 100 WBC
PLATELET # BLD AUTO: 168 K/UL (ref 150–400)
PMV BLD AUTO: 9.9 FL (ref 8.9–12.9)
POTASSIUM SERPL-SCNC: 3.9 MMOL/L (ref 3.5–5.1)
PROT SERPL-MCNC: 7.8 G/DL (ref 6.4–8.2)
RBC # BLD AUTO: 3.83 M/UL (ref 3.8–5.2)
SODIUM SERPL-SCNC: 140 MMOL/L (ref 136–145)
WBC # BLD AUTO: 6.3 K/UL (ref 3.6–11)

## 2024-09-10 PROCEDURE — 96365 THER/PROPH/DIAG IV INF INIT: CPT

## 2024-09-10 PROCEDURE — 36415 COLL VENOUS BLD VENIPUNCTURE: CPT

## 2024-09-10 PROCEDURE — 96375 TX/PRO/DX INJ NEW DRUG ADDON: CPT

## 2024-09-10 PROCEDURE — 6360000002 HC RX W HCPCS: Performed by: STUDENT IN AN ORGANIZED HEALTH CARE EDUCATION/TRAINING PROGRAM

## 2024-09-10 PROCEDURE — 87040 BLOOD CULTURE FOR BACTERIA: CPT

## 2024-09-10 PROCEDURE — 2580000003 HC RX 258: Performed by: STUDENT IN AN ORGANIZED HEALTH CARE EDUCATION/TRAINING PROGRAM

## 2024-09-10 PROCEDURE — 96366 THER/PROPH/DIAG IV INF ADDON: CPT

## 2024-09-10 PROCEDURE — 85025 COMPLETE CBC W/AUTO DIFF WBC: CPT

## 2024-09-10 PROCEDURE — 80053 COMPREHEN METABOLIC PANEL: CPT

## 2024-09-10 PROCEDURE — 99284 EMERGENCY DEPT VISIT MOD MDM: CPT

## 2024-09-10 RX ORDER — CEPHALEXIN 500 MG/1
500 CAPSULE ORAL 3 TIMES DAILY
Qty: 21 CAPSULE | Refills: 0 | Status: SHIPPED | OUTPATIENT
Start: 2024-09-10 | End: 2024-09-17

## 2024-09-10 RX ORDER — CEPHALEXIN 500 MG/1
500 CAPSULE ORAL 3 TIMES DAILY
Qty: 21 CAPSULE | Refills: 0 | Status: SHIPPED | OUTPATIENT
Start: 2024-09-10 | End: 2024-09-10

## 2024-09-10 RX ADMIN — CEFAZOLIN 1000 MG: 1 INJECTION, POWDER, FOR SOLUTION INTRAMUSCULAR; INTRAVENOUS at 19:05

## 2024-09-10 RX ADMIN — VANCOMYCIN HYDROCHLORIDE 750 MG: 750 INJECTION, POWDER, LYOPHILIZED, FOR SOLUTION INTRAVENOUS at 19:05

## 2024-09-10 ASSESSMENT — PAIN SCALES - GENERAL: PAINLEVEL_OUTOF10: 0

## 2024-09-10 ASSESSMENT — PAIN - FUNCTIONAL ASSESSMENT: PAIN_FUNCTIONAL_ASSESSMENT: 0-10

## 2024-09-10 ASSESSMENT — LIFESTYLE VARIABLES
HOW MANY STANDARD DRINKS CONTAINING ALCOHOL DO YOU HAVE ON A TYPICAL DAY: PATIENT DOES NOT DRINK
HOW OFTEN DO YOU HAVE A DRINK CONTAINING ALCOHOL: NEVER

## 2024-09-11 ENCOUNTER — FOLLOW UP (OUTPATIENT)
Dept: URBAN - METROPOLITAN AREA CLINIC 67 | Facility: CLINIC | Age: 89
End: 2024-09-11

## 2024-09-11 DIAGNOSIS — H35.3211: ICD-10-CM

## 2024-09-11 DIAGNOSIS — H43.813: ICD-10-CM

## 2024-09-11 DIAGNOSIS — H35.713: ICD-10-CM

## 2024-09-11 DIAGNOSIS — H35.3122: ICD-10-CM

## 2024-09-11 DIAGNOSIS — G45.3: ICD-10-CM

## 2024-09-11 PROCEDURE — 92134 CPTRZ OPH DX IMG PST SGM RTA: CPT

## 2024-09-11 PROCEDURE — 67028 INJECTION EYE DRUG: CPT

## 2024-09-11 PROCEDURE — 92014 COMPRE OPH EXAM EST PT 1/>: CPT | Mod: 25

## 2024-09-11 PROCEDURE — 92202 OPSCPY EXTND ON/MAC DRAW: CPT | Mod: 59

## 2024-09-11 ASSESSMENT — VISUAL ACUITY
OS_SC: 20/25+1
OD_SC: 20/30+2

## 2024-09-11 ASSESSMENT — TONOMETRY
OS_IOP_MMHG: 17
OD_IOP_MMHG: 16

## 2024-09-15 LAB
BACTERIA SPEC CULT: NORMAL
Lab: NORMAL

## 2024-09-16 ENCOUNTER — OFFICE VISIT (OUTPATIENT)
Age: 89
End: 2024-09-16
Payer: MEDICARE

## 2024-09-16 VITALS
BODY MASS INDEX: 13.33 KG/M2 | DIASTOLIC BLOOD PRESSURE: 83 MMHG | SYSTOLIC BLOOD PRESSURE: 176 MMHG | OXYGEN SATURATION: 98 % | WEIGHT: 80 LBS | HEIGHT: 65 IN

## 2024-09-16 DIAGNOSIS — T81.41XA SUPERFICIAL INCISIONAL INFECTION OF SURGICAL SITE: ICD-10-CM

## 2024-09-16 DIAGNOSIS — S82.002D CLOSED DISPLACED FRACTURE OF LEFT PATELLA WITH ROUTINE HEALING, UNSPECIFIED FRACTURE MORPHOLOGY, SUBSEQUENT ENCOUNTER: Primary | ICD-10-CM

## 2024-09-16 LAB
BACTERIA SPEC CULT: NORMAL
Lab: NORMAL

## 2024-09-16 PROCEDURE — 99214 OFFICE O/P EST MOD 30 MIN: CPT | Performed by: ORTHOPAEDIC SURGERY

## 2024-09-16 PROCEDURE — G8419 CALC BMI OUT NRM PARAM NOF/U: HCPCS | Performed by: ORTHOPAEDIC SURGERY

## 2024-09-16 PROCEDURE — G8427 DOCREV CUR MEDS BY ELIG CLIN: HCPCS | Performed by: ORTHOPAEDIC SURGERY

## 2024-09-16 PROCEDURE — 1123F ACP DISCUSS/DSCN MKR DOCD: CPT | Performed by: ORTHOPAEDIC SURGERY

## 2024-09-16 PROCEDURE — 1036F TOBACCO NON-USER: CPT | Performed by: ORTHOPAEDIC SURGERY

## 2024-09-16 PROCEDURE — 1090F PRES/ABSN URINE INCON ASSESS: CPT | Performed by: ORTHOPAEDIC SURGERY

## 2024-09-17 ENCOUNTER — HOSPITAL ENCOUNTER (OUTPATIENT)
Facility: HOSPITAL | Age: 89
Setting detail: OUTPATIENT SURGERY
Discharge: HOME | End: 2024-09-17
Attending: ORTHOPAEDIC SURGERY | Admitting: ORTHOPAEDIC SURGERY
Payer: MEDICARE

## 2024-09-17 ENCOUNTER — ANESTHESIA (OUTPATIENT)
Facility: HOSPITAL | Age: 89
End: 2024-09-17
Payer: MEDICARE

## 2024-09-17 ENCOUNTER — ANESTHESIA EVENT (OUTPATIENT)
Facility: HOSPITAL | Age: 89
End: 2024-09-17
Payer: MEDICARE

## 2024-09-17 VITALS
SYSTOLIC BLOOD PRESSURE: 153 MMHG | OXYGEN SATURATION: 100 % | HEIGHT: 65 IN | WEIGHT: 100 LBS | BODY MASS INDEX: 16.66 KG/M2 | TEMPERATURE: 97.4 F | DIASTOLIC BLOOD PRESSURE: 94 MMHG | RESPIRATION RATE: 16 BRPM | HEART RATE: 72 BPM

## 2024-09-17 DIAGNOSIS — S82.002A CLOSED DISPLACED FRACTURE OF LEFT PATELLA, UNSPECIFIED FRACTURE MORPHOLOGY, INITIAL ENCOUNTER: Primary | ICD-10-CM

## 2024-09-17 PROCEDURE — 6360000002 HC RX W HCPCS: Performed by: NURSE ANESTHETIST, CERTIFIED REGISTERED

## 2024-09-17 PROCEDURE — 6370000000 HC RX 637 (ALT 250 FOR IP): Performed by: ORTHOPAEDIC SURGERY

## 2024-09-17 PROCEDURE — 2580000003 HC RX 258: Performed by: ORTHOPAEDIC SURGERY

## 2024-09-17 PROCEDURE — 2720000010 HC SURG SUPPLY STERILE: Performed by: ORTHOPAEDIC SURGERY

## 2024-09-17 PROCEDURE — 3600000003 HC SURGERY LEVEL 3 BASE: Performed by: ORTHOPAEDIC SURGERY

## 2024-09-17 PROCEDURE — 2709999900 HC NON-CHARGEABLE SUPPLY: Performed by: ORTHOPAEDIC SURGERY

## 2024-09-17 PROCEDURE — 6360000002 HC RX W HCPCS: Performed by: ORTHOPAEDIC SURGERY

## 2024-09-17 PROCEDURE — 87077 CULTURE AEROBIC IDENTIFY: CPT

## 2024-09-17 PROCEDURE — 3700000001 HC ADD 15 MINUTES (ANESTHESIA): Performed by: ORTHOPAEDIC SURGERY

## 2024-09-17 PROCEDURE — 87070 CULTURE OTHR SPECIMN AEROBIC: CPT

## 2024-09-17 PROCEDURE — 87205 SMEAR GRAM STAIN: CPT

## 2024-09-17 PROCEDURE — 3700000000 HC ANESTHESIA ATTENDED CARE: Performed by: ORTHOPAEDIC SURGERY

## 2024-09-17 PROCEDURE — 7100000011 HC PHASE II RECOVERY - ADDTL 15 MIN: Performed by: ORTHOPAEDIC SURGERY

## 2024-09-17 PROCEDURE — 7100000000 HC PACU RECOVERY - FIRST 15 MIN: Performed by: ORTHOPAEDIC SURGERY

## 2024-09-17 PROCEDURE — 2500000003 HC RX 250 WO HCPCS: Performed by: NURSE ANESTHETIST, CERTIFIED REGISTERED

## 2024-09-17 PROCEDURE — 87186 SC STD MICRODIL/AGAR DIL: CPT

## 2024-09-17 PROCEDURE — 7100000001 HC PACU RECOVERY - ADDTL 15 MIN: Performed by: ORTHOPAEDIC SURGERY

## 2024-09-17 PROCEDURE — 3600000013 HC SURGERY LEVEL 3 ADDTL 15MIN: Performed by: ORTHOPAEDIC SURGERY

## 2024-09-17 PROCEDURE — 7100000010 HC PHASE II RECOVERY - FIRST 15 MIN: Performed by: ORTHOPAEDIC SURGERY

## 2024-09-17 PROCEDURE — 2500000003 HC RX 250 WO HCPCS: Performed by: ORTHOPAEDIC SURGERY

## 2024-09-17 RX ORDER — DIPHENHYDRAMINE HYDROCHLORIDE 50 MG/ML
12.5 INJECTION INTRAMUSCULAR; INTRAVENOUS
Status: DISCONTINUED | OUTPATIENT
Start: 2024-09-17 | End: 2024-09-17 | Stop reason: HOSPADM

## 2024-09-17 RX ORDER — SODIUM CHLORIDE 0.9 % (FLUSH) 0.9 %
5-40 SYRINGE (ML) INJECTION EVERY 12 HOURS SCHEDULED
Status: DISCONTINUED | OUTPATIENT
Start: 2024-09-17 | End: 2024-09-17 | Stop reason: HOSPADM

## 2024-09-17 RX ORDER — DEXTROSE MONOHYDRATE 100 MG/ML
INJECTION, SOLUTION INTRAVENOUS CONTINUOUS PRN
Status: DISCONTINUED | OUTPATIENT
Start: 2024-09-17 | End: 2024-09-17 | Stop reason: HOSPADM

## 2024-09-17 RX ORDER — NALOXONE HYDROCHLORIDE 0.4 MG/ML
INJECTION, SOLUTION INTRAMUSCULAR; INTRAVENOUS; SUBCUTANEOUS PRN
Status: DISCONTINUED | OUTPATIENT
Start: 2024-09-17 | End: 2024-09-17 | Stop reason: HOSPADM

## 2024-09-17 RX ORDER — SENNA AND DOCUSATE SODIUM 50; 8.6 MG/1; MG/1
1 TABLET, FILM COATED ORAL 2 TIMES DAILY PRN
Qty: 30 TABLET | Refills: 0 | Status: SHIPPED | OUTPATIENT
Start: 2024-09-17

## 2024-09-17 RX ORDER — HYDRALAZINE HYDROCHLORIDE 20 MG/ML
10 INJECTION INTRAMUSCULAR; INTRAVENOUS
Status: DISCONTINUED | OUTPATIENT
Start: 2024-09-17 | End: 2024-09-17 | Stop reason: HOSPADM

## 2024-09-17 RX ORDER — HYDROCODONE BITARTRATE AND ACETAMINOPHEN 5; 325 MG/1; MG/1
1 TABLET ORAL EVERY 6 HOURS PRN
Qty: 20 TABLET | Refills: 0 | Status: SHIPPED | OUTPATIENT
Start: 2024-09-17 | End: 2024-09-22

## 2024-09-17 RX ORDER — SODIUM CHLORIDE, SODIUM LACTATE, POTASSIUM CHLORIDE, CALCIUM CHLORIDE 600; 310; 30; 20 MG/100ML; MG/100ML; MG/100ML; MG/100ML
INJECTION, SOLUTION INTRAVENOUS CONTINUOUS
Status: DISCONTINUED | OUTPATIENT
Start: 2024-09-17 | End: 2024-09-17 | Stop reason: HOSPADM

## 2024-09-17 RX ORDER — SODIUM CHLORIDE, SODIUM LACTATE, POTASSIUM CHLORIDE, CALCIUM CHLORIDE 600; 310; 30; 20 MG/100ML; MG/100ML; MG/100ML; MG/100ML
INJECTION, SOLUTION INTRAVENOUS ONCE
Status: DISCONTINUED | OUTPATIENT
Start: 2024-09-17 | End: 2024-09-17 | Stop reason: HOSPADM

## 2024-09-17 RX ORDER — SODIUM CHLORIDE 0.9 % (FLUSH) 0.9 %
5-40 SYRINGE (ML) INJECTION PRN
Status: DISCONTINUED | OUTPATIENT
Start: 2024-09-17 | End: 2024-09-17 | Stop reason: HOSPADM

## 2024-09-17 RX ORDER — LABETALOL HYDROCHLORIDE 5 MG/ML
10 INJECTION, SOLUTION INTRAVENOUS
Status: DISCONTINUED | OUTPATIENT
Start: 2024-09-17 | End: 2024-09-17 | Stop reason: HOSPADM

## 2024-09-17 RX ORDER — BUPIVACAINE HYDROCHLORIDE 5 MG/ML
INJECTION, SOLUTION EPIDURAL; INTRACAUDAL PRN
Status: DISCONTINUED | OUTPATIENT
Start: 2024-09-17 | End: 2024-09-17 | Stop reason: ALTCHOICE

## 2024-09-17 RX ORDER — LIDOCAINE 4 G/G
1 PATCH TOPICAL AS NEEDED
Status: DISCONTINUED | OUTPATIENT
Start: 2024-09-17 | End: 2024-09-17 | Stop reason: HOSPADM

## 2024-09-17 RX ORDER — GLUCAGON 1 MG/ML
1 KIT INJECTION PRN
Status: DISCONTINUED | OUTPATIENT
Start: 2024-09-17 | End: 2024-09-17 | Stop reason: HOSPADM

## 2024-09-17 RX ORDER — TRAMADOL HYDROCHLORIDE 50 MG/1
50 TABLET ORAL
Status: DISCONTINUED | OUTPATIENT
Start: 2024-09-17 | End: 2024-09-17 | Stop reason: HOSPADM

## 2024-09-17 RX ORDER — IPRATROPIUM BROMIDE AND ALBUTEROL SULFATE 2.5; .5 MG/3ML; MG/3ML
1 SOLUTION RESPIRATORY (INHALATION)
Status: DISCONTINUED | OUTPATIENT
Start: 2024-09-17 | End: 2024-09-17 | Stop reason: HOSPADM

## 2024-09-17 RX ORDER — ONDANSETRON 2 MG/ML
4 INJECTION INTRAMUSCULAR; INTRAVENOUS
Status: DISCONTINUED | OUTPATIENT
Start: 2024-09-17 | End: 2024-09-17 | Stop reason: HOSPADM

## 2024-09-17 RX ORDER — HYDROMORPHONE HYDROCHLORIDE 1 MG/ML
0.25 INJECTION, SOLUTION INTRAMUSCULAR; INTRAVENOUS; SUBCUTANEOUS EVERY 5 MIN PRN
Status: DISCONTINUED | OUTPATIENT
Start: 2024-09-17 | End: 2024-09-17 | Stop reason: HOSPADM

## 2024-09-17 RX ORDER — ONDANSETRON 4 MG/1
4 TABLET, ORALLY DISINTEGRATING ORAL EVERY 8 HOURS PRN
Qty: 10 TABLET | Refills: 1 | Status: SHIPPED | OUTPATIENT
Start: 2024-09-17

## 2024-09-17 RX ORDER — LIDOCAINE HYDROCHLORIDE 10 MG/ML
INJECTION, SOLUTION EPIDURAL; INFILTRATION; INTRACAUDAL; PERINEURAL PRN
Status: DISCONTINUED | OUTPATIENT
Start: 2024-09-17 | End: 2024-09-17 | Stop reason: ALTCHOICE

## 2024-09-17 RX ORDER — SODIUM CHLORIDE 9 MG/ML
INJECTION, SOLUTION INTRAVENOUS PRN
Status: DISCONTINUED | OUTPATIENT
Start: 2024-09-17 | End: 2024-09-17 | Stop reason: HOSPADM

## 2024-09-17 RX ORDER — GLYCOPYRROLATE 0.2 MG/ML
INJECTION INTRAMUSCULAR; INTRAVENOUS
Status: DISCONTINUED | OUTPATIENT
Start: 2024-09-17 | End: 2024-09-17 | Stop reason: SDUPTHER

## 2024-09-17 RX ORDER — DOXYCYCLINE HYCLATE 100 MG
100 TABLET ORAL 2 TIMES DAILY
Qty: 28 TABLET | Refills: 0 | Status: SHIPPED | OUTPATIENT
Start: 2024-09-17 | End: 2024-10-01

## 2024-09-17 RX ORDER — ACETAMINOPHEN 500 MG
1000 TABLET ORAL ONCE
Status: COMPLETED | OUTPATIENT
Start: 2024-09-17 | End: 2024-09-17

## 2024-09-17 RX ORDER — FENTANYL CITRATE 50 UG/ML
INJECTION, SOLUTION INTRAMUSCULAR; INTRAVENOUS
Status: DISCONTINUED | OUTPATIENT
Start: 2024-09-17 | End: 2024-09-17 | Stop reason: SDUPTHER

## 2024-09-17 RX ORDER — FENTANYL CITRATE 50 UG/ML
25 INJECTION, SOLUTION INTRAMUSCULAR; INTRAVENOUS EVERY 5 MIN PRN
Status: DISCONTINUED | OUTPATIENT
Start: 2024-09-17 | End: 2024-09-17 | Stop reason: HOSPADM

## 2024-09-17 RX ORDER — IBUPROFEN 400 MG/1
400 TABLET, FILM COATED ORAL ONCE
Status: COMPLETED | OUTPATIENT
Start: 2024-09-17 | End: 2024-09-17

## 2024-09-17 RX ADMIN — GLYCOPYRROLATE 0.2 MG: 0.2 INJECTION, SOLUTION INTRAMUSCULAR; INTRAVENOUS at 07:54

## 2024-09-17 RX ADMIN — PROPOFOL 30 MCG/KG/MIN: 10 INJECTION, EMULSION INTRAVENOUS at 07:35

## 2024-09-17 RX ADMIN — CEFAZOLIN 1000 MG: 1 INJECTION, POWDER, FOR SOLUTION INTRAMUSCULAR; INTRAVENOUS at 07:40

## 2024-09-17 RX ADMIN — FENTANYL CITRATE 50 MCG: 50 INJECTION INTRAMUSCULAR; INTRAVENOUS at 07:35

## 2024-09-17 RX ADMIN — LIDOCAINE HYDROCHLORIDE 40 MG: 20 INJECTION, SOLUTION EPIDURAL; INFILTRATION; INTRACAUDAL; PERINEURAL at 07:35

## 2024-09-17 RX ADMIN — FENTANYL CITRATE 25 MCG: 50 INJECTION INTRAMUSCULAR; INTRAVENOUS at 08:00

## 2024-09-17 RX ADMIN — IBUPROFEN 400 MG: 400 TABLET, FILM COATED ORAL at 07:07

## 2024-09-17 RX ADMIN — ACETAMINOPHEN 1000 MG: 500 TABLET ORAL at 07:06

## 2024-09-17 RX ADMIN — SODIUM CHLORIDE, POTASSIUM CHLORIDE, SODIUM LACTATE AND CALCIUM CHLORIDE: 600; 310; 30; 20 INJECTION, SOLUTION INTRAVENOUS at 07:25

## 2024-09-17 ASSESSMENT — PAIN - FUNCTIONAL ASSESSMENT
PAIN_FUNCTIONAL_ASSESSMENT: NONE - DENIES PAIN
PAIN_FUNCTIONAL_ASSESSMENT: 0-10
PAIN_FUNCTIONAL_ASSESSMENT: 0-10
PAIN_FUNCTIONAL_ASSESSMENT: NONE - DENIES PAIN
PAIN_FUNCTIONAL_ASSESSMENT: 0-10

## 2024-09-17 ASSESSMENT — PAIN SCALES - GENERAL: PAINLEVEL_OUTOF10: 0

## 2024-09-20 LAB
BACTERIA SPEC CULT: ABNORMAL
BACTERIA SPEC CULT: ABNORMAL
GRAM STN SPEC: ABNORMAL
GRAM STN SPEC: ABNORMAL
Lab: ABNORMAL

## 2024-09-23 ENCOUNTER — OFFICE VISIT (OUTPATIENT)
Age: 89
End: 2024-09-23

## 2024-09-23 VITALS
TEMPERATURE: 98 F | RESPIRATION RATE: 16 BRPM | HEART RATE: 74 BPM | WEIGHT: 95.9 LBS | DIASTOLIC BLOOD PRESSURE: 73 MMHG | SYSTOLIC BLOOD PRESSURE: 152 MMHG | OXYGEN SATURATION: 98 % | BODY MASS INDEX: 15.98 KG/M2 | HEIGHT: 65 IN

## 2024-09-23 DIAGNOSIS — T81.41XA SUPERFICIAL INCISIONAL INFECTION OF SURGICAL SITE: Primary | ICD-10-CM

## 2024-09-23 PROCEDURE — 99024 POSTOP FOLLOW-UP VISIT: CPT | Performed by: ORTHOPAEDIC SURGERY

## 2024-09-24 DIAGNOSIS — T81.41XA SUPERFICIAL INCISIONAL INFECTION OF SURGICAL SITE: Primary | ICD-10-CM

## 2024-09-30 ENCOUNTER — TELEPHONE (OUTPATIENT)
Age: 89
End: 2024-09-30

## 2024-10-01 ENCOUNTER — TELEPHONE (OUTPATIENT)
Age: 89
End: 2024-10-01

## 2024-10-04 ENCOUNTER — TELEPHONE (OUTPATIENT)
Age: 89
End: 2024-10-04

## 2024-10-04 NOTE — TELEPHONE ENCOUNTER
Placed outbound call and confirmed 10/07/2024 appt at 2:40pm with patient. Advised of address change. Patient stated she was aware.

## 2024-10-07 ENCOUNTER — OFFICE VISIT (OUTPATIENT)
Age: 89
End: 2024-10-07

## 2024-10-07 VITALS
TEMPERATURE: 98.3 F | HEIGHT: 65 IN | SYSTOLIC BLOOD PRESSURE: 153 MMHG | RESPIRATION RATE: 16 BRPM | BODY MASS INDEX: 15.83 KG/M2 | DIASTOLIC BLOOD PRESSURE: 69 MMHG | HEART RATE: 97 BPM | WEIGHT: 95 LBS | OXYGEN SATURATION: 98 %

## 2024-10-07 DIAGNOSIS — T81.41XA SUPERFICIAL INCISIONAL INFECTION OF SURGICAL SITE: Primary | ICD-10-CM

## 2024-10-07 PROCEDURE — 99024 POSTOP FOLLOW-UP VISIT: CPT | Performed by: ORTHOPAEDIC SURGERY

## 2024-10-07 NOTE — PROGRESS NOTES
Chief Complaint   Patient presents with    Post-Op Check     BP (!) 153/69   Pulse 97   Temp 98.3 °F (36.8 °C)   Resp 16   Ht 1.66 m (5' 5.35\")   Wt 43.1 kg (95 lb)   LMP  (LMP Unknown)   SpO2 98%   BMI 15.64 kg/m²   1. Have you been to the ER, urgent care clinic since your last visit?  Hospitalized since your last visit?No    2. Have you seen or consulted any other health care providers outside of the [facility] since your last visit?  Include any pap smears or colon screening. No

## 2024-10-07 NOTE — PROGRESS NOTES
is here for a follow up visit after undergoing LEFT KNEE IRRIGATION AND sharp excisional debridement of skin, subcutaneous tissue and fascia with pickups and scalpel measuring 1.5x2.5 cm, AND incisional WOUND VAC PLACEMENT - Left on 9/17/2024.    Pain has been appropriate since surgery, no major medical complications since surgery. Patient reports pain/tenderness over patella.    The patient denies fevers, chills, nausea, vomiting, numbness/paresthesias.  The patient has been following the weight bearing precautions: Weightbearing as tolerated left lower extremity.  She completed doxycycline and is going to finish augmentin tomorrow.  Current Outpatient Medications on File Prior to Visit   Medication Sig Dispense Refill    amoxicillin-clavulanate (AUGMENTIN) 875-125 MG per tablet Take 1 tablet by mouth 2 times daily for 14 days 28 tablet 0    ondansetron (ZOFRAN-ODT) 4 MG disintegrating tablet Take 1 tablet by mouth every 8 hours as needed for Nausea or Vomiting (Patient not taking: Reported on 9/23/2024) 10 tablet 1    sennosides-docusate sodium (SENOKOT-S) 8.6-50 MG tablet Take 1 tablet by mouth 2 times daily as needed for Constipation (Patient not taking: Reported on 9/23/2024) 30 tablet 0     No current facility-administered medications on file prior to visit.       ROS:  General: denies agitation, fevers/chills  Cardiac: denies major chest pain  Gastrointestinal: denies nausea/vomiting  Neurologic: Denies numbness/paresthesias  Skin: Denies erythema, warmth to touch, active drainage  Musculoskeletal: Endorses appropriate pain at surgical site      Physical Examination:    Blood pressure (!) 153/69, pulse 97, temperature 98.3 °F (36.8 °C), resp. rate 16, height 1.66 m (5' 5.35\"), weight 43.1 kg (95 lb), SpO2 98%.    GENERAL: Patient is a healthy-appearing and in no apparent distress. Afebrile, normotensive, regular rate  MENTAL STATUS: Alert and oriented to time, place, person; mood and affect

## 2024-10-08 ENCOUNTER — OFFICE VISIT (OUTPATIENT)
Age: 89
End: 2024-10-08
Payer: MEDICARE

## 2024-10-08 VITALS
TEMPERATURE: 97.8 F | BODY MASS INDEX: 15.2 KG/M2 | WEIGHT: 91.2 LBS | SYSTOLIC BLOOD PRESSURE: 147 MMHG | RESPIRATION RATE: 16 BRPM | DIASTOLIC BLOOD PRESSURE: 73 MMHG | HEIGHT: 65 IN | HEART RATE: 68 BPM | OXYGEN SATURATION: 99 %

## 2024-10-08 DIAGNOSIS — L08.9 WOUND INFECTION: Primary | ICD-10-CM

## 2024-10-08 DIAGNOSIS — Z87.81 HISTORY OF PATELLAR FRACTURE: ICD-10-CM

## 2024-10-08 DIAGNOSIS — M25.562 CHRONIC PAIN OF LEFT KNEE: ICD-10-CM

## 2024-10-08 DIAGNOSIS — G89.29 CHRONIC PAIN OF LEFT KNEE: ICD-10-CM

## 2024-10-08 DIAGNOSIS — T14.8XXA WOUND INFECTION: Primary | ICD-10-CM

## 2024-10-08 PROCEDURE — 1036F TOBACCO NON-USER: CPT | Performed by: INTERNAL MEDICINE

## 2024-10-08 PROCEDURE — 1123F ACP DISCUSS/DSCN MKR DOCD: CPT | Performed by: INTERNAL MEDICINE

## 2024-10-08 PROCEDURE — 1090F PRES/ABSN URINE INCON ASSESS: CPT | Performed by: INTERNAL MEDICINE

## 2024-10-08 PROCEDURE — G8484 FLU IMMUNIZE NO ADMIN: HCPCS | Performed by: INTERNAL MEDICINE

## 2024-10-08 PROCEDURE — G8419 CALC BMI OUT NRM PARAM NOF/U: HCPCS | Performed by: INTERNAL MEDICINE

## 2024-10-08 PROCEDURE — 99204 OFFICE O/P NEW MOD 45 MIN: CPT | Performed by: INTERNAL MEDICINE

## 2024-10-08 PROCEDURE — G8427 DOCREV CUR MEDS BY ELIG CLIN: HCPCS | Performed by: INTERNAL MEDICINE

## 2024-10-08 RX ORDER — MUPIROCIN 20 MG/G
OINTMENT TOPICAL
Qty: 15 G | Refills: 0 | Status: SHIPPED | OUTPATIENT
Start: 2024-10-08 | End: 2024-10-15

## 2024-10-08 ASSESSMENT — ENCOUNTER SYMPTOMS
GASTROINTESTINAL NEGATIVE: 1
RESPIRATORY NEGATIVE: 1

## 2024-10-08 NOTE — PROGRESS NOTES
\"Have you been to the ER, urgent care clinic since your last visit?  Hospitalized since your last visit?\"    NO    “Have you seen or consulted any other health care providers outside our system since your last visit?”    NO    Chief Complaint   Patient presents with    New Patient     Possible infected incision     BP (!) 147/73   Pulse 68   Temp 97.8 °F (36.6 °C) (Temporal)   Resp 16   Ht 1.66 m (5' 5.35\")   Wt 41.4 kg (91 lb 3.2 oz)   LMP  (LMP Unknown)   SpO2 99%   BMI 15.01 kg/m²

## 2024-10-09 NOTE — PROGRESS NOTES
HPI: 89-y.o WF presenting as a referral by orthopedic from [facility] of left anterior knee incisional wound infection. Her medical history is significant for a closed displaced transverse fracture of left patella in 12/2023 after ground level fall.  She is s/p ORIF of left patellar by Ortho on12/2023.  She was seen by Ortho in 09/2024 w c/o purulent drainage from incisional wound and surrounding erythema.  Bactrim was prescribed, which pt self discontinued due to side effect of passing out.  She was subsequently seen in the ED on 09/10, given a dose of Vanc and Cefazolin, discharge on cephalexin, denies experiencing any side effects.  She underwent wound debridement in the OR by Ortho on 9/17, found to have a draining sinus.  P mirabilis and E faecalis were isolated from Intra-op wound Cx both sensitive to Augmentin. She is s/p 2 weeks of Augmentin, 2 more wks was prescribed after last visit with Ortho. She mentions sutures were removed from incisional wound during visit on 10/07 and she was referred to ID. She reports severe left knee pain/discomfort today. MRI of left knee is scheduled for 10/15/24. Pt was accompanied by son to todays visit who participated in history taking.     ROS  Review of Systems   Constitutional: Negative.    Respiratory: Negative.     Cardiovascular: Negative.    Gastrointestinal: Negative.    Genitourinary: Negative.    Musculoskeletal:         Left anterior knee pain    Skin:         Left knee incisional wound   Psychiatric/Behavioral: Negative.           History reviewed. No pertinent past medical history.     Past Surgical History:   Procedure Laterality Date    APPENDECTOMY      EYE SURGERY      LEG SURGERY Left 9/17/2024    LEFT KNEE IRRIGATION AND sharp excisional debridement of skin, subcutaneous tissue and fascia with pickups and scalpel measuring 1.5x2.5 cm, AND incisional WOUND VAC PLACEMENT performed by Madalyn Encinas MD at [facility] MAIN OR    PATELLA FRACTURE SURGERY Left

## 2024-10-13 LAB
BACTERIA SPEC AEROBE CULT: ABNORMAL
BACTERIA SPEC ANAEROBE CULT: ABNORMAL

## 2024-10-15 ENCOUNTER — HOSPITAL ENCOUNTER (OUTPATIENT)
Facility: HOSPITAL | Age: 89
Discharge: HOME | End: 2024-10-18
Attending: ORTHOPAEDIC SURGERY
Payer: MEDICARE

## 2024-10-15 VITALS — BODY MASS INDEX: 14.98 KG/M2 | WEIGHT: 91 LBS

## 2024-10-15 DIAGNOSIS — T81.41XA SUPERFICIAL INCISIONAL INFECTION OF SURGICAL SITE: ICD-10-CM

## 2024-10-15 PROCEDURE — 6360000004 HC RX CONTRAST MEDICATION: Performed by: RADIOLOGY

## 2024-10-15 PROCEDURE — 73723 MRI JOINT LWR EXTR W/O&W/DYE: CPT

## 2024-10-15 PROCEDURE — A9579 GAD-BASE MR CONTRAST NOS,1ML: HCPCS | Performed by: RADIOLOGY

## 2024-10-15 RX ADMIN — GADOTERIDOL 8 ML: 279.3 INJECTION, SOLUTION INTRAVENOUS at 10:29

## 2024-10-21 ENCOUNTER — OFFICE VISIT (OUTPATIENT)
Age: 89
End: 2024-10-21

## 2024-10-21 VITALS
BODY MASS INDEX: 15.16 KG/M2 | HEIGHT: 65 IN | RESPIRATION RATE: 16 BRPM | HEART RATE: 70 BPM | TEMPERATURE: 98.7 F | DIASTOLIC BLOOD PRESSURE: 73 MMHG | OXYGEN SATURATION: 95 % | SYSTOLIC BLOOD PRESSURE: 180 MMHG | WEIGHT: 91 LBS

## 2024-10-21 DIAGNOSIS — T81.41XA SUPERFICIAL INCISIONAL INFECTION OF SURGICAL SITE: Primary | ICD-10-CM

## 2024-10-21 PROCEDURE — 99024 POSTOP FOLLOW-UP VISIT: CPT | Performed by: ORTHOPAEDIC SURGERY

## 2024-10-21 NOTE — PROGRESS NOTES
Chief Complaint   Patient presents with    Post-Op Check     BP (!) 180/73   Pulse 70   Temp 98.7 °F (37.1 °C)   Resp 16   Ht 1.659 m (5' 5.3\")   Wt 41.3 kg (91 lb)   LMP  (LMP Unknown)   SpO2 95%   BMI 15.00 kg/m²   1. Have you been to the ER, urgent care clinic since your last visit?  Hospitalized since your last visit?No    2. Have you seen or consulted any other health care providers outside of the [facility] since your last visit?  Include any pap smears or colon screening. No

## 2024-10-21 NOTE — PROGRESS NOTES
is here for a follow up visit after undergoing LEFT KNEE IRRIGATION AND sharp excisional debridement of skin, subcutaneous tissue and fascia with pickups and scalpel measuring 1.5x2.5 cm, AND incisional WOUND VAC PLACEMENT - Left on 9/17/2024.    Pain has been appropriate since surgery, no major medical complications since surgery. Patient reports pain/tenderness over patella.    The patient denies fevers, chills, nausea, vomiting, numbness/paresthesias.  The patient has been following the weight bearing precautions: Weightbearing as tolerated left lower extremity.  She completed augmentin.  She was seen by ID and also placed on topical mupirocin.  She denies F/C, N/V.  She had MRI of left knee completed.  Current Outpatient Medications on File Prior to Visit   Medication Sig Dispense Refill    amoxicillin-clavulanate (AUGMENTIN) 875-125 MG per tablet Take 1 tablet by mouth 2 times daily for 14 days 28 tablet 0    ondansetron (ZOFRAN-ODT) 4 MG disintegrating tablet Take 1 tablet by mouth every 8 hours as needed for Nausea or Vomiting (Patient not taking: Reported on 9/23/2024) 10 tablet 1    sennosides-docusate sodium (SENOKOT-S) 8.6-50 MG tablet Take 1 tablet by mouth 2 times daily as needed for Constipation (Patient not taking: Reported on 9/23/2024) 30 tablet 0     No current facility-administered medications on file prior to visit.       ROS:  General: denies agitation, fevers/chills  Cardiac: denies major chest pain  Gastrointestinal: denies nausea/vomiting  Neurologic: Denies numbness/paresthesias  Skin: Denies erythema, warmth to touch, active drainage  Musculoskeletal: Endorses appropriate pain at surgical site      Physical Examination:    Blood pressure (!) 180/73, pulse 70, temperature 98.7 °F (37.1 °C), resp. rate 16, height 1.659 m (5' 5.3\"), weight 41.3 kg (91 lb), SpO2 95%.    GENERAL: Patient is a healthy-appearing and in no apparent distress. Afebrile, normotensive, regular rate  MENTAL

## 2024-11-04 ENCOUNTER — OFFICE VISIT (OUTPATIENT)
Age: 89
End: 2024-11-04

## 2024-11-04 VITALS
HEIGHT: 65 IN | HEART RATE: 76 BPM | BODY MASS INDEX: 15.94 KG/M2 | SYSTOLIC BLOOD PRESSURE: 163 MMHG | OXYGEN SATURATION: 98 % | WEIGHT: 95.68 LBS | DIASTOLIC BLOOD PRESSURE: 77 MMHG

## 2024-11-04 DIAGNOSIS — T81.41XA SUPERFICIAL INCISIONAL INFECTION OF SURGICAL SITE: Primary | ICD-10-CM

## 2024-11-04 PROCEDURE — 99024 POSTOP FOLLOW-UP VISIT: CPT | Performed by: ORTHOPAEDIC SURGERY

## 2024-11-04 NOTE — PROGRESS NOTES
Patient identified by name and date of birth  Angelica Jansen is a 89 y.o. female   Chief Complaint   Patient presents with    Post-Op Check     Superficial incisional infection of surgical site      Vitals:    11/04/24 1032 11/04/24 1035   BP: (!) 167/75 (!) 163/77   Site: Left Upper Arm    Pulse: 76    SpO2: 98%    Weight: 43.4 kg (95 lb 10.9 oz)    Height: 1.659 m (5' 5.32\")        No LMP recorded (lmp unknown). Patient is postmenopausal.           \"Have you been to the ER, urgent care clinic since your last visit?  Hospitalized since your last visit?\"    NO    “Have you seen or consulted any other health care providers outside of [facility] since your last visit?”    NO

## 2024-11-04 NOTE — PROGRESS NOTES
is here for a follow up visit after undergoing LEFT KNEE IRRIGATION AND sharp excisional debridement of skin, subcutaneous tissue and fascia with pickups and scalpel measuring 1.5x2.5 cm, AND incisional WOUND VAC PLACEMENT - Left on 9/17/2024.    Pain has been appropriate since surgery, no major medical complications since surgery. Patient reports pain/tenderness over patella and small suture tail over lateral aspect.    The patient denies fevers, chills, nausea, vomiting, numbness/paresthesias.  The patient has been following the weight bearing precautions: Weightbearing as tolerated left lower extremity.  She completed augmentin.  She was seen by ID and also placed on topical mupirocin.  She denies F/C, N/V.    Current Outpatient Medications on File Prior to Visit   Medication Sig Dispense Refill    ondansetron (ZOFRAN-ODT) 4 MG disintegrating tablet Take 1 tablet by mouth every 8 hours as needed for Nausea or Vomiting (Patient not taking: Reported on 9/23/2024) 10 tablet 1    sennosides-docusate sodium (SENOKOT-S) 8.6-50 MG tablet Take 1 tablet by mouth 2 times daily as needed for Constipation (Patient not taking: Reported on 9/23/2024) 30 tablet 0     No current facility-administered medications on file prior to visit.       ROS:  General: denies agitation, fevers/chills  Cardiac: denies major chest pain  Gastrointestinal: denies nausea/vomiting  Neurologic: Denies numbness/paresthesias  Skin: Denies erythema, warmth to touch, active drainage  Musculoskeletal: Endorses appropriate pain at surgical site      Physical Examination:    Blood pressure (!) 163/77, pulse 76, height 1.659 m (5' 5.32\"), weight 43.4 kg (95 lb 10.9 oz), SpO2 98%.    GENERAL: Patient is a healthy-appearing and in no apparent distress. Afebrile, normotensive, regular rate  MENTAL STATUS: Alert and oriented to time, place, person; mood and affect normal.  PULMONARY: Respiratory rate normal and non-labored on room

## 2024-12-02 ENCOUNTER — OFFICE VISIT (OUTPATIENT)
Age: 88
End: 2024-12-02

## 2024-12-02 VITALS
RESPIRATION RATE: 20 BRPM | DIASTOLIC BLOOD PRESSURE: 98 MMHG | SYSTOLIC BLOOD PRESSURE: 169 MMHG | WEIGHT: 95 LBS | TEMPERATURE: 97.7 F | OXYGEN SATURATION: 97 % | HEIGHT: 65 IN | HEART RATE: 78 BPM | BODY MASS INDEX: 15.83 KG/M2

## 2024-12-02 DIAGNOSIS — S82.002D CLOSED DISPLACED FRACTURE OF LEFT PATELLA WITH ROUTINE HEALING, UNSPECIFIED FRACTURE MORPHOLOGY, SUBSEQUENT ENCOUNTER: Primary | ICD-10-CM

## 2024-12-02 DIAGNOSIS — T81.41XA SUPERFICIAL INCISIONAL INFECTION OF SURGICAL SITE: ICD-10-CM

## 2024-12-02 PROCEDURE — 99024 POSTOP FOLLOW-UP VISIT: CPT | Performed by: ORTHOPAEDIC SURGERY

## 2024-12-02 RX ORDER — MUPIROCIN 20 MG/G
OINTMENT TOPICAL
Qty: 30 G | Refills: 1 | Status: SHIPPED | OUTPATIENT
Start: 2024-12-02 | End: 2024-12-09

## 2024-12-02 ASSESSMENT — PATIENT HEALTH QUESTIONNAIRE - PHQ9
SUM OF ALL RESPONSES TO PHQ QUESTIONS 1-9: 0
1. LITTLE INTEREST OR PLEASURE IN DOING THINGS: NOT AT ALL
SUM OF ALL RESPONSES TO PHQ QUESTIONS 1-9: 0
SUM OF ALL RESPONSES TO PHQ9 QUESTIONS 1 & 2: 0
2. FEELING DOWN, DEPRESSED OR HOPELESS: NOT AT ALL
SUM OF ALL RESPONSES TO PHQ QUESTIONS 1-9: 0
SUM OF ALL RESPONSES TO PHQ QUESTIONS 1-9: 0

## 2024-12-02 NOTE — PROGRESS NOTES
is here for a follow up visit after undergoing LEFT KNEE IRRIGATION AND sharp excisional debridement of skin, subcutaneous tissue and fascia with pickups and scalpel measuring 1.5x2.5 cm, AND incisional WOUND VAC PLACEMENT - Left on 9/17/2024.    Pain has been appropriate since surgery, no major medical complications since surgery. Patient reports pain/tenderness over patella more medially.    The patient denies fevers, chills, nausea, vomiting, numbness/paresthesias.  The patient has been following the weight bearing precautions: Weightbearing as tolerated left lower extremity.  She completed mupirocin. She denies F/C, N/V.    Current Outpatient Medications on File Prior to Visit   Medication Sig Dispense Refill    ondansetron (ZOFRAN-ODT) 4 MG disintegrating tablet Take 1 tablet by mouth every 8 hours as needed for Nausea or Vomiting (Patient not taking: Reported on 9/23/2024) 10 tablet 1    sennosides-docusate sodium (SENOKOT-S) 8.6-50 MG tablet Take 1 tablet by mouth 2 times daily as needed for Constipation (Patient not taking: Reported on 9/23/2024) 30 tablet 0     No current facility-administered medications on file prior to visit.       ROS:  General: denies agitation, fevers/chills  Cardiac: denies major chest pain  Gastrointestinal: denies nausea/vomiting  Neurologic: Denies numbness/paresthesias  Skin: Denies erythema, warmth to touch, active drainage  Musculoskeletal: Endorses appropriate pain at surgical site      Physical Examination:    Blood pressure (!) 169/98, pulse 78, temperature 97.7 °F (36.5 °C), resp. rate 20, height 1.659 m (5' 5.32\"), weight 43.1 kg (95 lb), SpO2 97%.    GENERAL: Patient is a healthy-appearing and in no apparent distress. Afebrile, normotensive, regular rate  MENTAL STATUS: Alert and oriented to time, place, person; mood and affect normal.  PULMONARY: Respiratory rate normal and non-labored on room air.  GASTROINTESTINAL: Nondistended abdomen  CARDIAC: Regular rate

## 2024-12-02 NOTE — PROGRESS NOTES
Identified pt with two pt identifiers(name and ). Reviewed record in preparation for visit and have obtained necessary documentation. All patient medications has been reviewed.  Chief Complaint   Patient presents with    Post-Op Check       Vitals:    24 1038   BP: (!) 169/98   Pulse: 78   Resp: 20   Temp: 97.7 °F (36.5 °C)   SpO2: 97%                   Coordination of Care Questionnaire:   1) Have you been to an emergency room, urgent care, or hospitalized since your last visit?   No       2. Have seen or consulted any other health care provider since your last visit? No        Patient is accompanied by son I have received verbal consent from Angelica Jansen to discuss any/all medical information while they are present in the room.

## 2024-12-17 ENCOUNTER — TELEPHONE (OUTPATIENT)
Age: 88
End: 2024-12-17

## 2024-12-17 NOTE — TELEPHONE ENCOUNTER
Identified pt with two pt identifiers (name and ). Reviewed chart in preparation for visit and have obtained necessary documentation. Spoke with pt and informed of providers message.  Asked pt if she would be able to come in per providers request for an appt tomorrow pt stated yes. Appt has been scheduled for 24 @1pm for wound check

## 2024-12-17 NOTE — TELEPHONE ENCOUNTER
Pt son called with concerns for his mothers knee. Pt is currently being treated by Dr. Encinas for her Fx of her Lt Knee and superficial infection of surgical site. Pt was last seen on 12/2 and her next appt is 1/13/25. Pt sent images through [employer] for Dr. Encinas to review and CB. Preferred CB number is [phone].

## 2024-12-18 ENCOUNTER — OFFICE VISIT (OUTPATIENT)
Age: 88
End: 2024-12-18
Payer: MEDICARE

## 2024-12-18 VITALS
HEART RATE: 78 BPM | WEIGHT: 99.21 LBS | OXYGEN SATURATION: 98 % | BODY MASS INDEX: 16.53 KG/M2 | HEIGHT: 65 IN | SYSTOLIC BLOOD PRESSURE: 155 MMHG | DIASTOLIC BLOOD PRESSURE: 74 MMHG

## 2024-12-18 DIAGNOSIS — T81.41XS INFECTION OF SUPERFICIAL INCISIONAL SURGICAL SITE AFTER PROCEDURE, SEQUELA: ICD-10-CM

## 2024-12-18 DIAGNOSIS — S82.002D CLOSED DISPLACED FRACTURE OF LEFT PATELLA WITH ROUTINE HEALING, UNSPECIFIED FRACTURE MORPHOLOGY, SUBSEQUENT ENCOUNTER: Primary | ICD-10-CM

## 2024-12-18 PROCEDURE — 1090F PRES/ABSN URINE INCON ASSESS: CPT | Performed by: ORTHOPAEDIC SURGERY

## 2024-12-18 PROCEDURE — 1160F RVW MEDS BY RX/DR IN RCRD: CPT | Performed by: ORTHOPAEDIC SURGERY

## 2024-12-18 PROCEDURE — 1123F ACP DISCUSS/DSCN MKR DOCD: CPT | Performed by: ORTHOPAEDIC SURGERY

## 2024-12-18 PROCEDURE — 1159F MED LIST DOCD IN RCRD: CPT | Performed by: ORTHOPAEDIC SURGERY

## 2024-12-18 PROCEDURE — G8427 DOCREV CUR MEDS BY ELIG CLIN: HCPCS | Performed by: ORTHOPAEDIC SURGERY

## 2024-12-18 PROCEDURE — G8484 FLU IMMUNIZE NO ADMIN: HCPCS | Performed by: ORTHOPAEDIC SURGERY

## 2024-12-18 PROCEDURE — 1036F TOBACCO NON-USER: CPT | Performed by: ORTHOPAEDIC SURGERY

## 2024-12-18 PROCEDURE — 99212 OFFICE O/P EST SF 10 MIN: CPT | Performed by: ORTHOPAEDIC SURGERY

## 2024-12-18 PROCEDURE — G8419 CALC BMI OUT NRM PARAM NOF/U: HCPCS | Performed by: ORTHOPAEDIC SURGERY

## 2024-12-18 RX ORDER — MUPIROCIN 20 MG/G
OINTMENT TOPICAL
COMMUNITY
Start: 2024-12-02

## 2024-12-18 NOTE — PROGRESS NOTES
Patient identified by name and date of birth  Angelica Jansen is a 89 y.o. female   Chief Complaint   Patient presents with    Follow-up     Left knee      Vitals:    12/18/24 1304 12/18/24 1310   BP: (!) 168/73 (!) 155/74   Site: Left Upper Arm    Pulse: 78    SpO2: 98%    Weight: 45 kg (99 lb 3.3 oz)    Height: 1.659 m (5' 5.32\")        No LMP recorded (lmp unknown). Patient is postmenopausal.           \"Have you been to the ER, urgent care clinic since your last visit?  Hospitalized since your last visit?\"    NO    “Have you seen or consulted any other health care providers outside of [facility] since your last visit?”    NO      
person; mood and affect normal.  PULMONARY: Respiratory rate normal and non-labored on room air.  GASTROINTESTINAL: Nondistended abdomen  CARDIAC: Regular rate and rhythm. Without pitting edema of affected extremity and peripheral pulses normal unless otherwise noted.  SKIN: No obvious lacerations or cutaneous disruptions in examined extremity unless otherwise noted.  GAIT: normal    FOCUSED MUSCULOSKELETAL SYSTEM:  left lower extremity   Incision with small 1 cm of dehiscence where eschar was previously with healthy pink granulation tissue.  Mild blanchable surrounding erythema medially with TTP over patella.   Sensation intact to light touch sural, saphenous, deep peroneal, superficial peroneal, and tibial nerve distributions  5/5 strength with EHL, FHL, ADF, APF  Range of motion is 0-95 deg    Imaging:  No new imaging    Intraoperative cultures grew Proteus mirabilis and Enterococcus faecalis      Assessment: 92 days Status post LEFT KNEE IRRIGATION AND sharp excisional debridement of skin, subcutaneous tissue and fascia with pickups and scalpel measuring 1.5x2.5 cm, AND incisional WOUND VAC PLACEMENT - Left    Plan:  - Weight bearing restrictions: Weightbearing as tolerated left lower extremity, range of motion as tolerated  -Continue cleaning wound, topical mupirocin, bandage.  Discussed that it should re-epithelialize overitme  -If at any point she develops worsening pain, swelling, drainage or fevers or chills she should contact the clinic immediately we will call in an antibiotic prescription for her and try to have her seen in clinic as soon as possible  - Follow up will 10 days from now  to re-evaluate wound      Electronically signed by Madalyn Encinas MD on 12/18/2024 at 1:29 PM

## 2024-12-30 ENCOUNTER — OFFICE VISIT (OUTPATIENT)
Age: 88
End: 2024-12-30
Payer: MEDICARE

## 2024-12-30 VITALS — WEIGHT: 99 LBS | BODY MASS INDEX: 16.5 KG/M2 | HEIGHT: 65 IN

## 2024-12-30 DIAGNOSIS — T81.41XA SUPERFICIAL INCISIONAL INFECTION OF SURGICAL SITE: Primary | ICD-10-CM

## 2024-12-30 PROCEDURE — 1036F TOBACCO NON-USER: CPT | Performed by: ORTHOPAEDIC SURGERY

## 2024-12-30 PROCEDURE — G8484 FLU IMMUNIZE NO ADMIN: HCPCS | Performed by: ORTHOPAEDIC SURGERY

## 2024-12-30 PROCEDURE — G8419 CALC BMI OUT NRM PARAM NOF/U: HCPCS | Performed by: ORTHOPAEDIC SURGERY

## 2024-12-30 PROCEDURE — 1160F RVW MEDS BY RX/DR IN RCRD: CPT | Performed by: ORTHOPAEDIC SURGERY

## 2024-12-30 PROCEDURE — 1123F ACP DISCUSS/DSCN MKR DOCD: CPT | Performed by: ORTHOPAEDIC SURGERY

## 2024-12-30 PROCEDURE — 1159F MED LIST DOCD IN RCRD: CPT | Performed by: ORTHOPAEDIC SURGERY

## 2024-12-30 PROCEDURE — G8427 DOCREV CUR MEDS BY ELIG CLIN: HCPCS | Performed by: ORTHOPAEDIC SURGERY

## 2024-12-30 PROCEDURE — 1090F PRES/ABSN URINE INCON ASSESS: CPT | Performed by: ORTHOPAEDIC SURGERY

## 2024-12-30 PROCEDURE — 99212 OFFICE O/P EST SF 10 MIN: CPT | Performed by: ORTHOPAEDIC SURGERY

## 2024-12-30 NOTE — PROGRESS NOTES
Identified pt with two pt identifiers (name and ). Reviewed chart in preparation for visit and have obtained necessary documentation.    Angelica Jansen is a 89 y.o. female Wound Check (LT patella fx wound check )  .    Vitals:    24 1145   Weight: 44.9 kg (99 lb)   Height: 1.651 m (5' 5\")          1. Have you been to the ER, urgent care clinic since your last visit?  Hospitalized since your last visit?  no     2. Have you seen or consulted any other health care providers outside of the [facility] since your last visit?  Include any pap smears or colon screening.  no

## 2024-12-30 NOTE — PROGRESS NOTES
is here for a follow up visit after undergoing LEFT KNEE IRRIGATION AND sharp excisional debridement of skin, subcutaneous tissue and fascia with pickups and scalpel measuring 1.5x2.5 cm, AND incisional WOUND VAC PLACEMENT - Left on 9/17/2024.    Pain has been appropriate since surgery, no major medical complications since surgery. She reports scab fell off on 12/16 and with some serosanguinous drainage, has been cleaning, applying mupirocin and covering with bandage. Patient reports pain/tenderness over patella more medially.    The patient denies fevers, chills, nausea, vomiting, numbness/paresthesias.  The patient has been following the weight bearing precautions: Weightbearing as tolerated left lower extremity.    Current Outpatient Medications on File Prior to Visit   Medication Sig Dispense Refill    mupirocin (BACTROBAN) 2 % ointment APPLY OINTMENT TOPICALLY TO AFFECTED AREA ONCE DAILY      ondansetron (ZOFRAN-ODT) 4 MG disintegrating tablet Take 1 tablet by mouth every 8 hours as needed for Nausea or Vomiting (Patient not taking: Reported on 9/23/2024) 10 tablet 1    sennosides-docusate sodium (SENOKOT-S) 8.6-50 MG tablet Take 1 tablet by mouth 2 times daily as needed for Constipation (Patient not taking: Reported on 9/23/2024) 30 tablet 0     No current facility-administered medications on file prior to visit.       ROS:  General: denies agitation, fevers/chills  Cardiac: denies major chest pain  Gastrointestinal: denies nausea/vomiting  Neurologic: Denies numbness/paresthesias  Skin: Denies erythema, warmth to touch, active drainage  Musculoskeletal: Endorses appropriate pain at surgical site      Physical Examination:    There were no vitals taken for this visit.    GENERAL: Patient is a healthy-appearing and in no apparent distress. Afebrile, normotensive, regular rate  MENTAL STATUS: Alert and oriented to time, place, person; mood and affect normal.  PULMONARY: Respiratory rate normal and

## 2025-01-13 ENCOUNTER — OFFICE VISIT (OUTPATIENT)
Age: 89
End: 2025-01-13
Payer: MEDICARE

## 2025-01-13 VITALS
BODY MASS INDEX: 16.6 KG/M2 | TEMPERATURE: 97.6 F | HEIGHT: 65 IN | WEIGHT: 99.65 LBS | DIASTOLIC BLOOD PRESSURE: 79 MMHG | HEART RATE: 76 BPM | OXYGEN SATURATION: 97 % | SYSTOLIC BLOOD PRESSURE: 182 MMHG

## 2025-01-13 DIAGNOSIS — S82.002D CLOSED DISPLACED FRACTURE OF LEFT PATELLA WITH ROUTINE HEALING, UNSPECIFIED FRACTURE MORPHOLOGY, SUBSEQUENT ENCOUNTER: ICD-10-CM

## 2025-01-13 DIAGNOSIS — T81.41XS INFECTION OF SUPERFICIAL INCISIONAL SURGICAL SITE AFTER PROCEDURE, SEQUELA: Primary | ICD-10-CM

## 2025-01-13 PROCEDURE — 1090F PRES/ABSN URINE INCON ASSESS: CPT | Performed by: ORTHOPAEDIC SURGERY

## 2025-01-13 PROCEDURE — G8427 DOCREV CUR MEDS BY ELIG CLIN: HCPCS | Performed by: ORTHOPAEDIC SURGERY

## 2025-01-13 PROCEDURE — 1125F AMNT PAIN NOTED PAIN PRSNT: CPT | Performed by: ORTHOPAEDIC SURGERY

## 2025-01-13 PROCEDURE — 1159F MED LIST DOCD IN RCRD: CPT | Performed by: ORTHOPAEDIC SURGERY

## 2025-01-13 PROCEDURE — 1036F TOBACCO NON-USER: CPT | Performed by: ORTHOPAEDIC SURGERY

## 2025-01-13 PROCEDURE — G8419 CALC BMI OUT NRM PARAM NOF/U: HCPCS | Performed by: ORTHOPAEDIC SURGERY

## 2025-01-13 PROCEDURE — 99213 OFFICE O/P EST LOW 20 MIN: CPT | Performed by: ORTHOPAEDIC SURGERY

## 2025-01-13 PROCEDURE — 1123F ACP DISCUSS/DSCN MKR DOCD: CPT | Performed by: ORTHOPAEDIC SURGERY

## 2025-01-13 PROCEDURE — 1160F RVW MEDS BY RX/DR IN RCRD: CPT | Performed by: ORTHOPAEDIC SURGERY

## 2025-01-13 ASSESSMENT — PATIENT HEALTH QUESTIONNAIRE - PHQ9
SUM OF ALL RESPONSES TO PHQ QUESTIONS 1-9: 0
SUM OF ALL RESPONSES TO PHQ9 QUESTIONS 1 & 2: 0
1. LITTLE INTEREST OR PLEASURE IN DOING THINGS: NOT AT ALL
SUM OF ALL RESPONSES TO PHQ QUESTIONS 1-9: 0
2. FEELING DOWN, DEPRESSED OR HOPELESS: NOT AT ALL

## 2025-01-13 NOTE — PROGRESS NOTES
Identified pt with two pt identifiers (name and ). Reviewed chart in preparation for visit and have obtained necessary documentation.    Angelica Jansen is a 89 y.o. female  Chief Complaint   Patient presents with    Follow-up     Follow up left knee     BP (!) 182/79 (Site: Left Upper Arm, Position: Sitting, Cuff Size: Small Adult)   Pulse 76   Temp 97.6 °F (36.4 °C) (Temporal)   Ht 1.651 m (5' 5\")   Wt 45.2 kg (99 lb 10.4 oz)   LMP  (LMP Unknown)   SpO2 97%   BMI 16.58 kg/m²     1. Have you been to the ER, urgent care clinic since your last visit?  Hospitalized since your last visit?no    2. Have you seen or consulted any other health care providers outside of the [facility] since your last visit?  Include any pap smears or colon screening. No    Patient does not have a primary care for her blood pressure will recheck after providers sees the patient.

## 2025-01-13 NOTE — PROGRESS NOTES
is here for a follow up visit after undergoing LEFT KNEE IRRIGATION AND sharp excisional debridement of skin, subcutaneous tissue and fascia with pickups and scalpel measuring 1.5x2.5 cm, AND incisional WOUND VAC PLACEMENT - Left on 9/17/2024.    Pain has been appropriate since surgery, no major medical complications since surgery. She reports scab fell off on 12/16 and with some serosanguinous drainage, has been cleaning, applying mupirocin and covering with bandage. Patient reports pain/tenderness over patella more medially.    The patient denies fevers, chills, nausea, vomiting, numbness/paresthesias.  The patient has been following the weight bearing precautions: Weightbearing as tolerated left lower extremity.    Current Outpatient Medications on File Prior to Visit   Medication Sig Dispense Refill    mupirocin (BACTROBAN) 2 % ointment APPLY OINTMENT TOPICALLY TO AFFECTED AREA ONCE DAILY      ondansetron (ZOFRAN-ODT) 4 MG disintegrating tablet Take 1 tablet by mouth every 8 hours as needed for Nausea or Vomiting (Patient not taking: Reported on 9/23/2024) 10 tablet 1    sennosides-docusate sodium (SENOKOT-S) 8.6-50 MG tablet Take 1 tablet by mouth 2 times daily as needed for Constipation (Patient not taking: Reported on 9/23/2024) 30 tablet 0     No current facility-administered medications on file prior to visit.       ROS:  General: denies agitation, fevers/chills  Cardiac: denies major chest pain  Gastrointestinal: denies nausea/vomiting  Neurologic: Denies numbness/paresthesias  Skin: Denies erythema, warmth to touch, active drainage  Musculoskeletal: Endorses appropriate pain at surgical site      Physical Examination:    Blood pressure (!) 182/79, pulse 76, temperature 97.6 °F (36.4 °C), temperature source Temporal, height 1.651 m (5' 5\"), weight 45.2 kg (99 lb 10.4 oz), SpO2 97%.    GENERAL: Patient is a healthy-appearing and in no apparent distress. Afebrile, normotensive, regular

## 2025-01-15 ENCOUNTER — FOLLOW UP (OUTPATIENT)
Dept: URBAN - METROPOLITAN AREA CLINIC 67 | Facility: CLINIC | Age: OVER 89
End: 2025-01-15

## 2025-01-15 DIAGNOSIS — H43.813: ICD-10-CM

## 2025-01-15 DIAGNOSIS — H35.713: ICD-10-CM

## 2025-01-15 DIAGNOSIS — H35.3122: ICD-10-CM

## 2025-01-15 DIAGNOSIS — H35.3211: ICD-10-CM

## 2025-01-15 DIAGNOSIS — G45.3: ICD-10-CM

## 2025-01-15 PROCEDURE — 92202 OPSCPY EXTND ON/MAC DRAW: CPT | Mod: 59

## 2025-01-15 PROCEDURE — 92014 COMPRE OPH EXAM EST PT 1/>: CPT | Mod: 25

## 2025-01-15 PROCEDURE — 67028 INJECTION EYE DRUG: CPT

## 2025-01-15 ASSESSMENT — TONOMETRY
OS_IOP_MMHG: 19
OD_IOP_MMHG: 18

## 2025-01-15 ASSESSMENT — VISUAL ACUITY
OD_PH: 20/30+2
OS_SC: 20/20-1
OD_SC: 20/50+2

## 2025-01-20 ENCOUNTER — TELEPHONE (OUTPATIENT)
Age: 89
End: 2025-01-20

## 2025-01-20 NOTE — TELEPHONE ENCOUNTER
Patient stated that she's seen Dr Encinas about this one spot that hasn't looked healed and from her knee, she was told by Dr Encinas to put on mupirocin cream on at night and let it air it during the day. Patients son told her to contact our office because the spot is wet not drainage on the area but it looks and feels wet and she wanted to talk to someone about it to make sure everything was ok. 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  Patient also stated that she's got an upcoming appointment on 2/3, patient wants to talk to someone about this to put her mind at easy. She can be reached at [phone]

## 2025-01-22 ENCOUNTER — TELEPHONE (OUTPATIENT)
Age: 89
End: 2025-01-22

## 2025-01-22 NOTE — TELEPHONE ENCOUNTER
Patient called stating that she ran out of medication   mupirocin (BACTROBAN) 2 % ointment [[id number]]    Order Details  Dose, Route, Frequency: As Directed   Dispense Quantity: -- Refills: --          Patient stated that she will not be back in town until 2 weeks from now. Patient is inquiring whether or not she can take a generic. Patient is requesting a call back number [phone]. Patient is requesting a call back.

## 2025-02-03 ENCOUNTER — OFFICE VISIT (OUTPATIENT)
Age: 89
End: 2025-02-03

## 2025-02-03 ENCOUNTER — HOSPITAL ENCOUNTER (OUTPATIENT)
Facility: HOSPITAL | Age: 89
Discharge: HOME | End: 2025-02-03
Attending: SURGERY
Payer: MEDICARE

## 2025-02-03 VITALS
TEMPERATURE: 97.3 F | SYSTOLIC BLOOD PRESSURE: 154 MMHG | WEIGHT: 90 LBS | HEIGHT: 65 IN | RESPIRATION RATE: 16 BRPM | BODY MASS INDEX: 14.99 KG/M2 | DIASTOLIC BLOOD PRESSURE: 98 MMHG | HEART RATE: 95 BPM

## 2025-02-03 VITALS
WEIGHT: 90 LBS | BODY MASS INDEX: 14.99 KG/M2 | TEMPERATURE: 97.5 F | HEART RATE: 95 BPM | DIASTOLIC BLOOD PRESSURE: 88 MMHG | SYSTOLIC BLOOD PRESSURE: 154 MMHG | OXYGEN SATURATION: 98 % | HEIGHT: 65 IN

## 2025-02-03 DIAGNOSIS — M00.9 CHRONIC INFECTION OF LEFT KNEE: Primary | ICD-10-CM

## 2025-02-03 DIAGNOSIS — T81.41XS INFECTION OF SUPERFICIAL INCISIONAL SURGICAL SITE AFTER PROCEDURE, SEQUELA: Primary | ICD-10-CM

## 2025-02-03 DIAGNOSIS — L02.416 CUTANEOUS ABSCESS OF LEFT KNEE: ICD-10-CM

## 2025-02-03 DIAGNOSIS — S81.002A OPEN KNEE WOUND, LEFT, INITIAL ENCOUNTER: ICD-10-CM

## 2025-02-03 DIAGNOSIS — S82.002D CLOSED DISPLACED FRACTURE OF LEFT PATELLA WITH ROUTINE HEALING, UNSPECIFIED FRACTURE MORPHOLOGY, SUBSEQUENT ENCOUNTER: ICD-10-CM

## 2025-02-03 PROCEDURE — 87186 SC STD MICRODIL/AGAR DIL: CPT

## 2025-02-03 PROCEDURE — 11042 DBRDMT SUBQ TIS 1ST 20SQCM/<: CPT

## 2025-02-03 PROCEDURE — 87205 SMEAR GRAM STAIN: CPT

## 2025-02-03 PROCEDURE — 87077 CULTURE AEROBIC IDENTIFY: CPT

## 2025-02-03 PROCEDURE — 99213 OFFICE O/P EST LOW 20 MIN: CPT

## 2025-02-03 PROCEDURE — 10060 I&D ABSCESS SIMPLE/SINGLE: CPT

## 2025-02-03 PROCEDURE — 87070 CULTURE OTHR SPECIMN AEROBIC: CPT

## 2025-02-03 ASSESSMENT — PATIENT HEALTH QUESTIONNAIRE - PHQ9
SUM OF ALL RESPONSES TO PHQ QUESTIONS 1-9: 0
SUM OF ALL RESPONSES TO PHQ QUESTIONS 1-9: 0
1. LITTLE INTEREST OR PLEASURE IN DOING THINGS: NOT AT ALL
SUM OF ALL RESPONSES TO PHQ QUESTIONS 1-9: 0
SUM OF ALL RESPONSES TO PHQ QUESTIONS 1-9: 0
SUM OF ALL RESPONSES TO PHQ9 QUESTIONS 1 & 2: 0
2. FEELING DOWN, DEPRESSED OR HOPELESS: NOT AT ALL

## 2025-02-03 NOTE — PROGRESS NOTES
Identified pt with two pt identifiers (name and ). Reviewed chart in preparation for visit and have obtained necessary documentation.    Angelica Jansen is a 89 y.o. female Follow-up (Left knee wound, Wound care did Wound debridement added medication to  it and dressed it )  .    Vitals:    25 1141   BP: (!) 154/88   Site: Left Upper Arm   Position: Sitting   Cuff Size: Medium Adult   Pulse: 95   Temp: 97.5 °F (36.4 °C)   TempSrc: Temporal   SpO2: 98%   Weight: 40.8 kg (90 lb)   Height: 1.651 m (5' 5\")          1. Have you been to the ER, urgent care clinic since your last visit?  Hospitalized since your last visit?  no     2. Have you seen or consulted any other health care providers outside of the [facility] since your last visit?  Include any pap smears or colon screening.  no

## 2025-02-03 NOTE — OP NOTE
Procedure Note  Indications: Based on my examination of this patient's wound(s)/ulcer(s) today, debridement is required to promote healing and evaluate the wound base.    Debridement: Excisional Debridement    Using: #11 surgical blade the wound(s)/ulcer(s) was/were debrided down through and including the removal of epidermis, dermis, and subcutaneous tissue.  Performed by: Amira Greenwood MD  Consent obtained: Yes  Time out taken: Yes  Pain Control:         Devitalized Tissue Debrided: fibrin and biofilm    Pre Debridement Measurements:  Are located in the Wound/Ulcer Documentation Flow Sheet    Diabetic/Pressure/Non Pressure Ulcers only:  Ulcer: Other: Left knee wound      Wound/Ulcer #: 1  Post Debridement Measurements:  Wound/Ulcer Descriptions are Pre Debridement except measurements:  Wound 02/03/25 Knee Left #1 (Active)   Wound Image   02/03/25 1036   Wound Etiology Other 02/03/25 1036   Dressing Status Other (Comment) 02/03/25 1036   Wound Cleansed Cleansed with saline 02/03/25 1036   Wound Length (cm) 0.4 cm 02/03/25 1036   Wound Width (cm) 0.5 cm 02/03/25 1036   Wound Depth (cm) 0.1 cm 02/03/25 1036   Wound Surface Area (cm^2) 0.2 cm^2 02/03/25 1036   Wound Volume (cm^3) 0.02 cm^3 02/03/25 1036   Post-Procedure Length (cm) 0.6 cm 02/03/25 1108   Post-Procedure Width (cm) 0.7 cm 02/03/25 1108   Post-Procedure Depth (cm) 0.2 cm 02/03/25 1108   Post-Procedure Surface Area (cm^2) 0.42 cm^2 02/03/25 1108   Post-Procedure Volume (cm^3) 0.084 cm^3 02/03/25 1108   Wound Assessment Slough 02/03/25 1036   Drainage Amount Moderate (25-50%) 02/03/25 1036   Drainage Description Serosanguinous 02/03/25 1036   Odor None 02/03/25 1036   Aida-wound Assessment Intact;Dry/flaky 02/03/25 1036   Margins Attached edges 02/03/25 1036   Wound Thickness Description not for Pressure Injury Partial thickness 02/03/25 1036   Number of days: 0        Total Surface Area Debrided:  0.42 sq cm   Estimated Blood Loss: Minimal amount

## 2025-02-03 NOTE — WOUND CARE
Wound Clinic Physician Orders and Discharge Instructions  [facility]  [address], [address]  [place] [address]  Telephone: [phone]     FAX [phone]    NAME:  Angelica Jansen  YOB: 1934  MEDICAL RECORD NUMBER:  [id number]  DATE:  2/3/2025      Return Appointment:    [x] Dressing Supply Provider: Star  [] Home Healthcare:  [x] Return Appointment: 1 Week(s)  [] Nurse Visit:     [] Discharge from [facility]:   [] Healed        [] Refer to Provider:         [] Consult    Follow-up Information:    [] Ordered Tests:    [] Vascular/Arterial Testing   [] Please call [phone] to schedule at any [facility] facility      [] Imaging    [] Please call [phone] to schedule at any [facility] facility    [] Referrals:    [] Infectious Disease  [] Vascular  [] Other:    [x] Rx to pharmacy: Augmentin  [x] Would Culture obtained in clinic  [x] Other: Follow up with Dr. Encinas as scheduled      Wound Cleansing:   Do not scrub or use excessive force.  Cleanse wound prior to applying a clean dressing with:    [x] Normal Saline   [] Mild Soap & Water     [] Keep Wound Dry in Shower  [] Wear a cast cover to shower  [] Other:       Topical Treatments:  Do not apply lotions, creams, or ointments to wound bed unless directed.     [] Apply moisturizing lotion to skin surrounding the wound prior to dressing change.  [] Apply thin film of moisture barrier ointment (Zinc) to skin immediately around wound.  [] Apply Betadine to skin immediately around wound   [] Apply Skin Prep to skin immediately around wound  [] Other:      Dressings:           Wound Location L Knee    [x] Apply Primary Dressing:       [] MediHoney Gel  [x] Calcium Alginate with Silver   [] Calcium Alginate without silver   [] Collagen with silver [] Collagen without Silver    [] Santyl with moist saline gauze     [] Hydrofera Blue (cut to size and moistened with normal saline)  [] Hydrofera Blue Ready (cut to

## 2025-02-03 NOTE — WOUND CARE
Wound Care Supplies      Supply Company:     [employer] [address] [place] [address] f: [phone] f: [phone] p: [phone] [email or web address]      Ordering Center:     [facility]  [address] [place] [address], [address] [place] [address]    Phone: [phone]  Fax: [phone]    Patient Information:      Angelica Jansen  [address] [facility] [address]   [phone]   : 1934  AGE: 89 y.o.     GENDER: female   EPISODE DATE: 2/3/2025    Insurance:      PRIMARY INSURANCE:  Plan: MEDICARE PART A AND B  Coverage: MEDICARE  Effective Date: 3/1/2000  Group Number: [unfilled]  Subscriber Number: [id number] - (Medicare)    Payer/Plan Subscr  Sex Relation Sub. Ins. ID Effective Group Num   1. MEDICARE - ME* ANGELICA JANSEN* 1934 Female Self [id number] 3/1/00                                    [address]    2. [employer]AN[employer]A* 1934 Female Self [id number]                                    [address]       Patient Wound Information:      Problem List Items Addressed This Visit    None      WOUNDS REQUIRING DRESSING SUPPLIES:     Wound 25 Knee Left #1 (Active)   Wound Image   25 1036   Wound Etiology Other 25 1036   Dressing Status Other (Comment) 25 1036   Wound Cleansed Cleansed with saline 25 1036   Wound Length (cm) 0.4 cm 25 1036   Wound Width (cm) 0.5 cm 25 1036   Wound Depth (cm) 0.1 cm 25 1036   Wound Surface Area (cm^2) 0.2 cm^2 25 1036   Wound Volume (cm^3) 0.02 cm^3 25 1036   Post-Procedure Length (cm) 0.6 cm 25 1108   Post-Procedure Width (cm) 0.7 cm 25 1108   Post-Procedure Depth (cm) 0.2 cm 25 1108   Post-Procedure Surface Area (cm^2) 0.42 cm^2 25 1108   Post-Procedure Volume (cm^3) 0.084 cm^3 25 1108   Wound Assessment Slough 25 1036   Drainage Amount Moderate (25-50%) 25 1036

## 2025-02-03 NOTE — PROGRESS NOTES
is here for a follow up visit after undergoing LEFT KNEE IRRIGATION AND sharp excisional debridement of skin, subcutaneous tissue and fascia with pickups and scalpel measuring 1.5x2.5 cm, AND incisional WOUND VAC PLACEMENT - Left on 9/17/2024.       Patient was seen by wound care clinic today and underwent slight debridement and wound culture.  She was started on Augmentin.  Current Outpatient Medications on File Prior to Visit   Medication Sig Dispense Refill    amoxicillin-clavulanate (AUGMENTIN) 875-125 MG per tablet Take 1 tablet by mouth 2 times daily for 7 days 14 tablet 0     No current facility-administered medications on file prior to visit.       ROS:  General: denies agitation, fevers/chills  Cardiac: denies major chest pain  Gastrointestinal: denies nausea/vomiting  Neurologic: Denies numbness/paresthesias  Skin: Denies erythema, warmth to touch, active drainage  Musculoskeletal: Endorses appropriate pain at surgical site      Physical Examination:    Blood pressure (!) 154/88, pulse 95, temperature 97.5 °F (36.4 °C), temperature source Temporal, height 1.651 m (5' 5\"), weight 40.8 kg (90 lb), SpO2 98%.    GENERAL: Patient is a healthy-appearing and in no apparent distress. Afebrile, normotensive, regular rate  MENTAL STATUS: Alert and oriented to time, place, person; mood and affect normal.  PULMONARY: Respiratory rate normal and non-labored on room air.  GASTROINTESTINAL: Nondistended abdomen  CARDIAC: Regular rate and rhythm. Without pitting edema of affected extremity and peripheral pulses normal unless otherwise noted.  SKIN: No obvious lacerations or cutaneous disruptions in examined extremity unless otherwise noted.  GAIT: normal    FOCUSED MUSCULOSKELETAL SYSTEM:  left lower extremity   Incision with small 1 cm of dehiscence where eschar was previously with healthy pink granulation tissue.  Mild blanchable surrounding erythema medially with TTP over patella.  Minimal serous

## 2025-02-03 NOTE — DISCHARGE INSTRUCTIONS
Wound Clinic Physician Orders and Discharge Instructions  [facility]  [address], [address]  [place] [address]  Telephone: [phone]     FAX [phone]    NAME:  Angelica Jansen  YOB: 1934  MEDICAL RECORD NUMBER:  [id number]  DATE:  2/3/2025      Return Appointment:    [x] Dressing Supply Provider: Star  [] Home Healthcare:  [x] Return Appointment: 1 Week(s)  [] Nurse Visit:     [] Discharge from [facility]:   [] Healed        [] Refer to Provider:         [] Consult    Follow-up Information:    [] Ordered Tests:    [] Vascular/Arterial Testing   [] Please call [phone] to schedule at any [facility] facility      [] Imaging    [] Please call [phone] to schedule at any [facility] facility    [] Referrals:    [] Infectious Disease  [] Vascular  [] Other:    [x] Rx to pharmacy: Augmentin  [x] Would Culture obtained in clinic  [x] Other: Follow up with Dr. Encinas as scheduled      Wound Cleansing:   Do not scrub or use excessive force.  Cleanse wound prior to applying a clean dressing with:    [x] Normal Saline   [] Mild Soap & Water     [] Keep Wound Dry in Shower  [] Wear a cast cover to shower  [] Other:       Topical Treatments:  Do not apply lotions, creams, or ointments to wound bed unless directed.     [] Apply moisturizing lotion to skin surrounding the wound prior to dressing change.  [] Apply thin film of moisture barrier ointment (Zinc) to skin immediately around wound.  [] Apply Betadine to skin immediately around wound   [] Apply Skin Prep to skin immediately around wound  [] Other:      Dressings:           Wound Location L Knee    [x] Apply Primary Dressing:       [] MediHoney Gel  [x] Calcium Alginate with Silver   [] Calcium Alginate without silver   [] Collagen with silver [] Collagen without Silver    [] Santyl with moist saline gauze     [] Hydrofera Blue (cut to size and moistened with normal saline)  [] Hydrofera Blue Ready (cut to

## 2025-02-03 NOTE — CONSULTS
[facility]   Wound Care and Hyperbaric Oxygen Therapy Center   Medical Staff Note     Angelica Jansen  MEDICAL RECORD NUMBER:  [id number]  AGE: 89 y.o.   GENDER: female  : 1934  EPISODE DATE:  2/3/2025      Chief Complaint:   Chief Complaint   Patient presents with    Wound Check     L knee       History of Present Illness:     Angelica Jansen is a 89 y.o. female who presents today for wound/ulcer evaluation.  She had a left knee surgery performed by Dr. Encinas in the past.  But the wound did not heal very well with persistent drainage.  At some point she had undergone an exploration of the wound with removal of the stitch.  However she continues to have swelling pain in the same incision area on her left knee.  Hence patient was referred to the wound lung center for further management.    History of Wound Context: Per original history and physical on this patient. Changes in history since last evaluation: As in HPI.  Wound/Ulcer Pain Timing/Severity: PAIN ASSESSMENT WOUND: constant  Quality of pain: PAIN QUALITY: sharp  Severity:  3 / 10   Modifying Factors: Modifying Factors Wound: Pain worsens with walking  Associated Signs/Symptoms: ASSOCIATED SYMPTOMS WOUND: edema, erythema, and drainage    Ulcer Identification:  Wound type: surgical incision     HEALTH FACTORS: none    Wound: Left knee infected wound/abscess    Past Medical History:       Diagnosis Date    Cerebral artery occlusion with cerebral infarction (HCC)        Past Surgical History:   Past Surgical History:   Procedure Laterality Date    APPENDECTOMY      EYE SURGERY      JOINT REPLACEMENT      LEG SURGERY Left 2024    LEFT KNEE IRRIGATION AND sharp excisional debridement of skin, subcutaneous tissue and fascia with pickups and scalpel measuring 1.5x2.5 cm, AND incisional WOUND VAC PLACEMENT performed by Madalyn Encinas MD at [facility] MAIN OR    PATELLA FRACTURE SURGERY Left 2023    PATELLA

## 2025-02-06 LAB
BACTERIA SPEC CULT: ABNORMAL
BACTERIA SPEC CULT: ABNORMAL
GRAM STN SPEC: ABNORMAL
Lab: ABNORMAL
Lab: ABNORMAL

## 2025-02-08 PROBLEM — M00.9 CHRONIC INFECTION OF LEFT KNEE (HCC): Status: ACTIVE | Noted: 2025-02-08

## 2025-02-08 PROBLEM — L02.416: Status: ACTIVE | Noted: 2025-02-08

## 2025-02-08 PROBLEM — S81.002A OPEN KNEE WOUND, LEFT, INITIAL ENCOUNTER: Status: ACTIVE | Noted: 2025-02-08

## 2025-02-10 ENCOUNTER — HOSPITAL ENCOUNTER (OUTPATIENT)
Facility: HOSPITAL | Age: 89
Discharge: HOME | End: 2025-02-10
Attending: SURGERY
Payer: MEDICARE

## 2025-02-10 VITALS
HEART RATE: 81 BPM | TEMPERATURE: 97.2 F | DIASTOLIC BLOOD PRESSURE: 88 MMHG | SYSTOLIC BLOOD PRESSURE: 164 MMHG | RESPIRATION RATE: 16 BRPM

## 2025-02-10 DIAGNOSIS — L02.416 CUTANEOUS ABSCESS OF LEFT KNEE: ICD-10-CM

## 2025-02-10 DIAGNOSIS — M00.9 CHRONIC INFECTION OF LEFT KNEE: Primary | ICD-10-CM

## 2025-02-10 DIAGNOSIS — S81.002A OPEN KNEE WOUND, LEFT, INITIAL ENCOUNTER: ICD-10-CM

## 2025-02-10 PROCEDURE — 99213 OFFICE O/P EST LOW 20 MIN: CPT

## 2025-02-10 RX ORDER — CIPROFLOXACIN 500 MG/1
500 TABLET, FILM COATED ORAL 2 TIMES DAILY
Qty: 28 TABLET | Refills: 0 | Status: SHIPPED | OUTPATIENT
Start: 2025-02-10 | End: 2025-02-24

## 2025-02-10 NOTE — DISCHARGE INSTRUCTIONS
Wound Clinic Physician Orders and Discharge Instructions  [facility]  [address], [address]  [place] [address]  Telephone: [phone]     FAX [phone]    NAME:  Angelica Jansen  YOB: 1934  MEDICAL RECORD NUMBER:  [id number]  DATE:  2/10/2025      Return Appointment:    [] Dressing Supply Provider: Star  [] Home Healthcare:  [x] Return Appointment: 1 Week(s)  [] Nurse Visit:     [] Discharge from [facility]:   [] Healed        [] Refer to Provider:         [] Consult    Follow-up Information:    [] Ordered Tests:    [] Vascular/Arterial Testing   [] Please call [phone] to schedule at any [facility] facility      [] Imaging    [] Please call [phone] to schedule at any [facility] facility    [] Referrals:    [] Infectious Disease  [] Vascular  [] Other:    [x] Rx to pharmacy: Cipro  [] Would Culture obtained in clinic  [x] Other: Follow up with Dr. Encinas as scheduled      Wound Cleansing:   Do not scrub or use excessive force.  Cleanse wound prior to applying a clean dressing with:    [x] Normal Saline   [] Mild Soap & Water     [] Keep Wound Dry in Shower  [] Wear a cast cover to shower  [] Other:       Topical Treatments:  Do not apply lotions, creams, or ointments to wound bed unless directed.     [] Apply moisturizing lotion to skin surrounding the wound prior to dressing change.  [] Apply thin film of moisture barrier ointment (Zinc) to skin immediately around wound.  [] Apply Betadine to skin immediately around wound   [] Apply Skin Prep to skin immediately around wound  [] Other:      Dressings:           Wound Location L Knee    [x] Apply Primary Dressing:       [] MediHoney Gel  [x] Calcium Alginate with Silver   [] Calcium Alginate without silver   [] Collagen with silver [] Collagen without Silver    [] Santyl with moist saline gauze     [] Hydrofera Blue (cut to size and moistened with normal saline)  [] Hydrofera Blue Ready (cut to size)

## 2025-02-10 NOTE — WOUND CARE
Wound Clinic Physician Orders and Discharge Instructions  [facility]  [address], [address]  [place] [address]  Telephone: [phone]     FAX [phone]    NAME:  Angelica Jansen  YOB: 1934  MEDICAL RECORD NUMBER:  [id number]  DATE:  2/10/2025      Return Appointment:    [] Dressing Supply Provider: Star  [] Home Healthcare:  [x] Return Appointment: 1 Week(s)  [] Nurse Visit:     [] Discharge from [facility]:   [] Healed        [] Refer to Provider:         [] Consult    Follow-up Information:    [] Ordered Tests:    [] Vascular/Arterial Testing   [] Please call [phone] to schedule at any [facility] facility      [] Imaging    [] Please call [phone] to schedule at any [facility] facility    [] Referrals:    [] Infectious Disease  [] Vascular  [] Other:    [x] Rx to pharmacy: Cipro  [] Would Culture obtained in clinic  [x] Other: Follow up with Dr. Encinas as scheduled      Wound Cleansing:   Do not scrub or use excessive force.  Cleanse wound prior to applying a clean dressing with:    [x] Normal Saline   [] Mild Soap & Water     [] Keep Wound Dry in Shower  [] Wear a cast cover to shower  [] Other:       Topical Treatments:  Do not apply lotions, creams, or ointments to wound bed unless directed.     [] Apply moisturizing lotion to skin surrounding the wound prior to dressing change.  [] Apply thin film of moisture barrier ointment (Zinc) to skin immediately around wound.  [] Apply Betadine to skin immediately around wound   [] Apply Skin Prep to skin immediately around wound  [] Other:      Dressings:           Wound Location L Knee    [x] Apply Primary Dressing:       [] MediHoney Gel  [x] Calcium Alginate with Silver   [] Calcium Alginate without silver   [] Collagen with silver [] Collagen without Silver    [] Santyl with moist saline gauze     [] Hydrofera Blue (cut to size and moistened with normal saline)  [] Hydrofera Blue Ready (cut to size)

## 2025-02-10 NOTE — WOUND CARE
Wound culture reviewed with Dr Greenwood and antibiotics e-prescribed.     Patient verbalized she does not leave the bandage on the wound all the time. Educated patient on proper wound care and dressing changes.   3004

## 2025-02-10 NOTE — PROGRESS NOTES
Pressure @  mm/Hg  []Continuous []Intermittent   [] Black/Green Foam  [] White Foam to any tunnels and/or undermining [] Bridge for comfort/pressure relief  [] Change dressing 3 times per week   [] Skin Prep to narcisa-wound  [] Other:        Edema Control:  Apply: [] Compression Stocking:  mmHg  []Right Leg []Left Leg   [] Tubigrip []Right Leg Double Layer []Left Leg Double Layer      []Right Leg Single Layer []Left Leg Single Layer      [x] Elevate leg(s) above the level of the heart when sitting.    [x] Avoid prolonged standing in one place.     Compression:  Apply: [] Compression Wrap to: []RightLeg []Left Leg    []  2 Layer Compression 30-40mmHg []  2 Layer compression Light 20-30 mmHg  [] Unna with Calamine     [] Do not get leg(s) with wrap wet. If wrap becomes too tight, call the wound center or home health agency and remove wrap from leg by unrolling each layer.   [] Elevate leg(s) above the level of the heart when sitting.    [] Avoid prolonged standing in one place.    Dietary:  [x] Diet as tolerated [] Diabetic Diet [] No Added Salt  [x] Increase Protein [] Other:     Activity:  [x] Activity as tolerated  [] Patient has no activity restrictions      [] Strict Bedrest   [] Remain off Work:       [] May return to full duty work                                    [] Return to work with restrictions:     Physician:  [] Dr. Renae Nicole  [] Dr. Brook Jo   [x] Dr. Amira Greenwodo  [] Phillip Doyle PA-C  [] Dr. Che Remy  [] Dr. Ze Ferguson  [] Dr. Fabienne Otero    Nurse Case Manger:  Chuyita      Wound Care Center Information: Should you experience any significant changes in your wound(s) or have questions about your wound care, please contact the Wound Center at [phone]. Our hours are Monday - Friday 8am - 4:30pm, closed all major holidays. If you need help with your wound outside these hours and cannot wait until we are again available, contact your PCP or go to the hospital emergency room.

## 2025-02-17 ENCOUNTER — HOSPITAL ENCOUNTER (OUTPATIENT)
Facility: HOSPITAL | Age: 89
Discharge: HOME | End: 2025-02-17
Attending: SURGERY
Payer: MEDICARE

## 2025-02-17 VITALS
TEMPERATURE: 97.2 F | SYSTOLIC BLOOD PRESSURE: 190 MMHG | HEART RATE: 82 BPM | DIASTOLIC BLOOD PRESSURE: 78 MMHG | RESPIRATION RATE: 18 BRPM

## 2025-02-17 DIAGNOSIS — S81.002D OPEN KNEE WOUND, LEFT, SUBSEQUENT ENCOUNTER: Primary | ICD-10-CM

## 2025-02-17 PROCEDURE — 99213 OFFICE O/P EST LOW 20 MIN: CPT

## 2025-02-17 NOTE — WOUND CARE
Wound Clinic Physician Orders and Discharge Instructions  [facility]  [address], [address]  [place] [address]  Telephone: [phone]     FAX [phone]    NAME:  Angelica Jansen  YOB: 1934  MEDICAL RECORD NUMBER:  [id number]  DATE:  2/17/2025      Return Appointment:    [] Dressing Supply Provider: Star  [] Home Healthcare:  [x] Return Appointment: 1 Week(s)  [] Nurse Visit:     [] Discharge from [facility]:   [] Healed        [] Refer to Provider:         [] Consult    Follow-up Information:    [] Ordered Tests:    [] Vascular/Arterial Testing   [] Please call [phone] to schedule at any [facility] facility      [] Imaging    [] Please call [phone] to schedule at any [facility] facility    [] Referrals:    [] Infectious Disease  [] Vascular  [] Other:    [] Rx to pharmacy:   [] Would Culture obtained in clinic  [x] Other: Follow up with Dr. Encinas      Wound Cleansing:   Do not scrub or use excessive force.  Cleanse wound prior to applying a clean dressing with:    [x] Normal Saline   [] Mild Soap & Water     [] Keep Wound Dry in Shower  [] Wear a cast cover to shower  [] Other:       Topical Treatments:  Do not apply lotions, creams, or ointments to wound bed unless directed.     [] Apply moisturizing lotion to skin surrounding the wound prior to dressing change.  [] Apply thin film of moisture barrier ointment (Zinc) to skin immediately around wound.  [] Apply Betadine to skin immediately around wound   [] Apply Skin Prep to skin immediately around wound  [] Other:      Dressings:           Wound Location L Knee    [x] Apply Primary Dressing:       [] MediHoney Gel  [x] Calcium Alginate with Silver   [] Calcium Alginate without silver   [] Collagen with silver [] Collagen without Silver    [] Santyl with moist saline gauze     [] Hydrofera Blue (cut to size and moistened with normal saline)  [] Hydrofera Blue Ready (cut to size)      [] Normal

## 2025-02-17 NOTE — DISCHARGE INSTRUCTIONS
Wound Clinic Physician Orders and Discharge Instructions  [facility]  [address], [address]  [place] [address]  Telephone: [phone]     FAX [phone]    NAME:  Angelica Jansen  YOB: 1934  MEDICAL RECORD NUMBER:  [id number]  DATE:  2/17/2025      Return Appointment:    [] Dressing Supply Provider: Star  [] Home Healthcare:  [x] Return Appointment: 1 Week(s)  [] Nurse Visit:     [] Discharge from [facility]:   [] Healed        [] Refer to Provider:         [] Consult    Follow-up Information:    [] Ordered Tests:    [] Vascular/Arterial Testing   [] Please call [phone] to schedule at any [facility] facility      [] Imaging    [] Please call [phone] to schedule at any [facility] facility    [] Referrals:    [] Infectious Disease  [] Vascular  [] Other:    [] Rx to pharmacy:   [] Would Culture obtained in clinic  [x] Other: Follow up with Dr. Encinas      Wound Cleansing:   Do not scrub or use excessive force.  Cleanse wound prior to applying a clean dressing with:    [x] Normal Saline   [] Mild Soap & Water     [] Keep Wound Dry in Shower  [] Wear a cast cover to shower  [] Other:       Topical Treatments:  Do not apply lotions, creams, or ointments to wound bed unless directed.     [] Apply moisturizing lotion to skin surrounding the wound prior to dressing change.  [] Apply thin film of moisture barrier ointment (Zinc) to skin immediately around wound.  [] Apply Betadine to skin immediately around wound   [] Apply Skin Prep to skin immediately around wound  [] Other:      Dressings:           Wound Location L Knee    [x] Apply Primary Dressing:       [] MediHoney Gel  [x] Calcium Alginate with Silver   [] Calcium Alginate without silver   [] Collagen with silver [] Collagen without Silver    [] Santyl with moist saline gauze     [] Hydrofera Blue (cut to size and moistened with normal saline)  [] Hydrofera Blue Ready (cut to size)      [] Normal Saline

## 2025-02-17 NOTE — PROGRESS NOTES
[facility]   Wound Care and Hyperbaric Oxygen Therapy Center   Medical Staff Note     Angelica Jansen  MEDICAL RECORD NUMBER:  [id number]  AGE: 89 y.o.   GENDER: female  : 1934  EPISODE DATE:  2025      Chief Complaint:   Chief Complaint   Patient presents with    Wound Check     L knee       History of Present Illness:     Angelica Jansen is a 89 y.o. female who presents today for wound/ulcer evaluation.  She had a left knee surgery performed by Dr. Encinas in the past.  But the wound did not heal very well with persistent drainage.  At some point she had undergone an exploration of the wound with removal of the stitch.  However she continues to have swelling pain in the same incision area on her left knee.  Hence patient was referred to the wound lung center for further management.    Wound culture from last week were positive for Proteus.  No further drainage.  No fever or chills.  She is currently taking ciprofloxacin 5 mg p.o. twice daily.    History of Wound Context: Per original history and physical on this patient. Changes in history since last evaluation: As in HPI.  Wound/Ulcer Pain Timing/Severity: None   Quality of pain: None  Severity: 0 / 10    Modifying Factors Wound: Pain worsens with walking  ASSOCIATED SYMPTOMS WOUND: edema, erythema, and drainage    Ulcer Identification:  Wound type: surgical incision     HEALTH FACTORS: none    Wound: Left knee infected wound/abscess    Past Medical History:       Diagnosis Date    Cerebral artery occlusion with cerebral infarction (HCC)        Past Surgical History:   Past Surgical History:   Procedure Laterality Date    APPENDECTOMY      EYE SURGERY      JOINT REPLACEMENT      LEG SURGERY Left 2024    LEFT KNEE IRRIGATION AND sharp excisional debridement of skin, subcutaneous tissue and fascia with pickups and scalpel measuring 1.5x2.5 cm, AND incisional WOUND VAC PLACEMENT performed by Madalyn Encinas

## 2025-02-22 PROBLEM — S81.002D OPEN KNEE WOUND, LEFT, SUBSEQUENT ENCOUNTER: Status: ACTIVE | Noted: 2025-02-22

## 2025-02-24 ENCOUNTER — HOSPITAL ENCOUNTER (OUTPATIENT)
Facility: HOSPITAL | Age: 89
Discharge: HOME | End: 2025-02-24
Attending: SURGERY
Payer: MEDICARE

## 2025-02-24 VITALS
TEMPERATURE: 97.2 F | HEART RATE: 80 BPM | RESPIRATION RATE: 18 BRPM | SYSTOLIC BLOOD PRESSURE: 168 MMHG | DIASTOLIC BLOOD PRESSURE: 76 MMHG

## 2025-02-24 DIAGNOSIS — L02.416 CUTANEOUS ABSCESS OF LEFT KNEE: ICD-10-CM

## 2025-02-24 DIAGNOSIS — M00.9 CHRONIC INFECTION OF LEFT KNEE (HCC): Primary | ICD-10-CM

## 2025-02-24 DIAGNOSIS — S81.002A OPEN KNEE WOUND, LEFT, INITIAL ENCOUNTER: ICD-10-CM

## 2025-02-24 DIAGNOSIS — S81.002D OPEN KNEE WOUND, LEFT, SUBSEQUENT ENCOUNTER: ICD-10-CM

## 2025-02-24 PROCEDURE — 99213 OFFICE O/P EST LOW 20 MIN: CPT

## 2025-02-24 RX ORDER — CIPROFLOXACIN 500 MG/1
500 TABLET, FILM COATED ORAL 2 TIMES DAILY
Qty: 14 TABLET | Refills: 0 | Status: SHIPPED | OUTPATIENT
Start: 2025-02-24 | End: 2025-03-03

## 2025-02-24 NOTE — WOUND CARE
Wound Clinic Physician Orders and Discharge Instructions  [facility]  [address], [address]  [place] [address]  Telephone: [phone]     FAX [phone]    NAME:  Angelica Jansen  YOB: 1934  MEDICAL RECORD NUMBER:  [id number]  DATE:  2/24/2025      Return Appointment:    [] Dressing Supply Provider: Star  [] Home Healthcare:  [x] Return Appointment: 1 Week(s) with Dr. Remy  [] Nurse Visit:     [] Discharge from [facility]:   [] Healed        [] Refer to Provider:         [] Consult    Follow-up Information:    [] Ordered Tests:    [] Vascular/Arterial Testing   [] Please call [phone] to schedule at any [facility] facility      [] Imaging    [] Please call [phone] to schedule at any [facility] facility    [] Referrals:    [] Infectious Disease  [] Vascular  [] Other:    [x] Rx to pharmacy: Cipro  [] Would Culture obtained in clinic  [x] Other: Follow up with Dr. Encinas      Wound Cleansing:   Do not scrub or use excessive force.  Cleanse wound prior to applying a clean dressing with:    [x] Normal Saline   [] Mild Soap & Water     [] Keep Wound Dry in Shower  [] Wear a cast cover to shower  [] Other:       Topical Treatments:  Do not apply lotions, creams, or ointments to wound bed unless directed.     [] Apply moisturizing lotion to skin surrounding the wound prior to dressing change.  [] Apply thin film of moisture barrier ointment (Zinc) to skin immediately around wound.  [] Apply Betadine to skin immediately around wound   [] Apply Skin Prep to skin immediately around wound  [] Other:      Dressings:           Wound Location L Knee    [x] Apply Primary Dressing:       [] MediHoney Gel  [x] Calcium Alginate with Silver   [] Calcium Alginate without silver   [] Collagen with silver [] Collagen without Silver    [] Santyl with moist saline gauze     [] Hydrofera Blue (cut to size and moistened with normal saline)  [] Hydrofera Blue Ready (cut to

## 2025-02-24 NOTE — DISCHARGE INSTRUCTIONS
Wound Clinic Physician Orders and Discharge Instructions  [facility]  [address], [address]  [place] [address]  Telephone: [phone]     FAX [phone]    NAME:  Angelica Jansen  YOB: 1934  MEDICAL RECORD NUMBER:  [id number]  DATE:  2/24/2025      Return Appointment:    [] Dressing Supply Provider: Star  [] Home Healthcare:  [x] Return Appointment: 1 Week(s) with Dr. Remy  [] Nurse Visit:     [] Discharge from [facility]:   [] Healed        [] Refer to Provider:         [] Consult    Follow-up Information:    [] Ordered Tests:    [] Vascular/Arterial Testing   [] Please call [phone] to schedule at any [facility] facility      [] Imaging    [] Please call [phone] to schedule at any [facility] facility    [] Referrals:    [] Infectious Disease  [] Vascular  [] Other:    [x] Rx to pharmacy: Cipro  [] Would Culture obtained in clinic  [x] Other: Follow up with Dr. Encinas      Wound Cleansing:   Do not scrub or use excessive force.  Cleanse wound prior to applying a clean dressing with:    [x] Normal Saline   [] Mild Soap & Water     [] Keep Wound Dry in Shower  [] Wear a cast cover to shower  [] Other:       Topical Treatments:  Do not apply lotions, creams, or ointments to wound bed unless directed.     [] Apply moisturizing lotion to skin surrounding the wound prior to dressing change.  [] Apply thin film of moisture barrier ointment (Zinc) to skin immediately around wound.  [] Apply Betadine to skin immediately around wound   [] Apply Skin Prep to skin immediately around wound  [] Other:      Dressings:           Wound Location L Knee    [x] Apply Primary Dressing:       [] MediHoney Gel  [x] Calcium Alginate with Silver   [] Calcium Alginate without silver   [] Collagen with silver [] Collagen without Silver    [] Santyl with moist saline gauze     [] Hydrofera Blue (cut to size and moistened with normal saline)  [] Hydrofera Blue Ready (cut to size)

## 2025-02-24 NOTE — PROGRESS NOTES
[facility]   Wound Care and Hyperbaric Oxygen Therapy Center   Medical Staff Note     Angelica Jansen  MEDICAL RECORD NUMBER:  [id number]  AGE: 89 y.o.   GENDER: female  : 1934  EPISODE DATE:  2025      Chief Complaint:   Chief Complaint   Patient presents with    Wound Check     Left knee       History of Present Illness:     Angelica Jansen is a 89 y.o. female who presents today for wound/ulcer evaluation.  She had a left knee surgery performed by Dr. Encinas in the past.  But the wound did not heal very well with persistent drainage.  At some point she had undergone an exploration of the wound with removal of the stitch.  However she continues to have swelling pain in the same incision area on her left knee.  Hence patient was referred to the wound healing center for further management.    Wound culture from last week were positive for Proteus.  No further drainage.  No fever or chills.  She is currently taking ciprofloxacin 5 mg p.o. twice daily.    History of Wound Context: Per original history and physical on this patient. Changes in history since last evaluation: As in HPI.  Wound/Ulcer Pain Timing/Severity: None   Quality of pain: None  Severity: 0 / 10    Modifying Factors Wound: Pain worsens with walking  ASSOCIATED SYMPTOMS WOUND: edema, erythema, and drainage    Ulcer Identification:  Wound type: surgical incision     HEALTH FACTORS: none    Wound: Left knee infected wound/abscess    Past Medical History:       Diagnosis Date    Cerebral artery occlusion with cerebral infarction (HCC)        Past Surgical History:   Past Surgical History:   Procedure Laterality Date    APPENDECTOMY      EYE SURGERY      JOINT REPLACEMENT      LEG SURGERY Left 2024    LEFT KNEE IRRIGATION AND sharp excisional debridement of skin, subcutaneous tissue and fascia with pickups and scalpel measuring 1.5x2.5 cm, AND incisional WOUND VAC PLACEMENT performed by Huang

## 2025-03-06 ENCOUNTER — HOSPITAL ENCOUNTER (OUTPATIENT)
Facility: HOSPITAL | Age: 89
Discharge: HOME | End: 2025-03-06
Attending: SURGERY
Payer: MEDICARE

## 2025-03-06 ENCOUNTER — TELEPHONE (OUTPATIENT)
Age: 89
End: 2025-03-06

## 2025-03-06 VITALS
HEART RATE: 81 BPM | TEMPERATURE: 97.1 F | DIASTOLIC BLOOD PRESSURE: 83 MMHG | RESPIRATION RATE: 18 BRPM | SYSTOLIC BLOOD PRESSURE: 179 MMHG

## 2025-03-06 DIAGNOSIS — L97.829: Primary | ICD-10-CM

## 2025-03-06 DIAGNOSIS — S81.002D OPEN KNEE WOUND, LEFT, SUBSEQUENT ENCOUNTER: ICD-10-CM

## 2025-03-06 DIAGNOSIS — S81.002A OPEN KNEE WOUND, LEFT, INITIAL ENCOUNTER: ICD-10-CM

## 2025-03-06 DIAGNOSIS — M00.9 CHRONIC INFECTION OF LEFT KNEE (HCC): Primary | ICD-10-CM

## 2025-03-06 DIAGNOSIS — L02.416 CUTANEOUS ABSCESS OF LEFT KNEE: ICD-10-CM

## 2025-03-06 DIAGNOSIS — S82.002A CLOSED DISPLACED FRACTURE OF LEFT PATELLA, UNSPECIFIED FRACTURE MORPHOLOGY, INITIAL ENCOUNTER: ICD-10-CM

## 2025-03-06 PROCEDURE — 11042 DBRDMT SUBQ TIS 1ST 20SQCM/<: CPT

## 2025-03-06 PROCEDURE — 87205 SMEAR GRAM STAIN: CPT

## 2025-03-06 PROCEDURE — 87070 CULTURE OTHR SPECIMN AEROBIC: CPT

## 2025-03-06 PROCEDURE — 87075 CULTR BACTERIA EXCEPT BLOOD: CPT

## 2025-03-06 RX ORDER — LIDOCAINE HYDROCHLORIDE 10 MG/ML
10 INJECTION, SOLUTION EPIDURAL; INFILTRATION; INTRACAUDAL; PERINEURAL ONCE
Status: DISCONTINUED | OUTPATIENT
Start: 2025-03-06 | End: 2025-03-07 | Stop reason: HOSPADM

## 2025-03-06 RX ORDER — CIPROFLOXACIN 500 MG/1
500 TABLET, FILM COATED ORAL 2 TIMES DAILY
Qty: 28 TABLET | Refills: 0 | Status: SHIPPED | OUTPATIENT
Start: 2025-03-06 | End: 2025-03-20

## 2025-03-06 NOTE — DISCHARGE INSTRUCTIONS
[] MediHoney Gel  [x] Calcium Alginate with Silver   [] Calcium Alginate without silver              [] Collagen with silver            [] Collagen without Silver    [] Santyl with moist saline gauze                [] Hydrofera Blue (cut to size and moistened with normal saline)    [] Hydrofera Blue Ready (cut to size)                      [] Normal Saline wet to dry    [] Betadine wet to dry                          [] Hydrogel                                            [] Bactroban/Mupirocin              [] Iodoform Packing Strip      [] Plain Packing Strip              [] Skin Sub, Steri-Strips, Mepitel (DO NOT REMOVE)  [] Other:       [x] Cover and Secure with:                   [x] Gauze         [] Finn           [] Kerlix              [x] Zetuvit Plus Silicone Border or Foam Border        [] Super Absorbant    [] ABD                              [] Ace Wrap   [] Other:               Limit contact of tape with skin.     [x] Change dressing:   [] Daily           [] Every Other Day    [] Twice per week   [x] Three times per week              [] Once a week          [] Do Not Change Dressing     [] Other:                Negative Pressure:           Wound Location:   [] Pressure @  mm/Hg                      []Continuous  []Intermittent              [] Black/Green Foam             [] White Foam to any tunnels and/or undermining   [] Bridge for comfort/pressure relief  [] Change dressing 3 times per week           [] Skin Prep to narcisa-wound  [] Other:      Edema Control:  Apply:  [] Compression Stocking:  mmHg     []Right Leg     []Left Leg              [] Tubigrip      []Right Leg Double Layer      []Left Leg Double Layer                                      []Right Leg Single Layer       []Left Leg Single Layer                            [x] Elevate leg(s) above the level of the heart when sitting.                 [x] Avoid prolonged standing in one place.         Dietary:  [x] Diet as tolerated     []

## 2025-03-06 NOTE — WOUND CARE
the Wound Center at [phone]. Our hours are Monday - Friday 8am - 4:30pm, closed all major holidays. If you need help with your wound outside these hours and cannot wait until we are again available, contact your PCP or go to the hospital emergency room.     PLEASE NOTE: IF YOU ARE UNABLE TO OBTAIN WOUND SUPPLIES, CONTINUE TO USE THE SUPPLIES YOU HAVE AVAILABLE UNTIL YOU ARE ABLE TO REACH US. IT IS MOST IMPORTANT TO KEEP THE WOUND COVERED AT ALL TIMES.       
wound outside these hours and cannot wait until we are again available, contact your PCP or go to the hospital emergency room.      PLEASE NOTE: IF YOU ARE UNABLE TO OBTAIN WOUND SUPPLIES, CONTINUE TO USE THE SUPPLIES YOU HAVE AVAILABLE UNTIL YOU ARE ABLE TO REACH US. IT IS MOST IMPORTANT TO KEEP THE WOUND COVERED AT ALL TIMES.                             Electronically signed by Che Remy MD on 3/6/2025 at 12:57 PM

## 2025-03-06 NOTE — TELEPHONE ENCOUNTER
Dr. Remy did a culture on her.  The patient has 2  cipro pills left.  He wants to know what you think of the results.

## 2025-03-08 LAB
BACTERIA SPEC CULT: NORMAL
BACTERIA SPEC CULT: NORMAL
GRAM STN SPEC: NORMAL
GRAM STN SPEC: NORMAL
Lab: NORMAL
Lab: NORMAL

## 2025-03-10 ENCOUNTER — HOSPITAL ENCOUNTER (OUTPATIENT)
Facility: HOSPITAL | Age: 89
Discharge: HOME | End: 2025-03-13
Attending: ORTHOPAEDIC SURGERY
Payer: MEDICARE

## 2025-03-10 DIAGNOSIS — M00.9 CHRONIC INFECTION OF LEFT KNEE (HCC): ICD-10-CM

## 2025-03-10 DIAGNOSIS — S82.002A CLOSED DISPLACED FRACTURE OF LEFT PATELLA, UNSPECIFIED FRACTURE MORPHOLOGY, INITIAL ENCOUNTER: ICD-10-CM

## 2025-03-10 PROCEDURE — 73700 CT LOWER EXTREMITY W/O DYE: CPT

## 2025-03-10 PROCEDURE — 73562 X-RAY EXAM OF KNEE 3: CPT

## 2025-03-13 ENCOUNTER — HOSPITAL ENCOUNTER (OUTPATIENT)
Facility: HOSPITAL | Age: 89
Discharge: HOME | End: 2025-03-13
Attending: SURGERY
Payer: MEDICARE

## 2025-03-13 VITALS
HEART RATE: 87 BPM | SYSTOLIC BLOOD PRESSURE: 188 MMHG | DIASTOLIC BLOOD PRESSURE: 93 MMHG | RESPIRATION RATE: 18 BRPM | TEMPERATURE: 97 F

## 2025-03-13 DIAGNOSIS — M00.9 CHRONIC INFECTION OF LEFT KNEE (HCC): Primary | ICD-10-CM

## 2025-03-13 DIAGNOSIS — L02.416 CUTANEOUS ABSCESS OF LEFT KNEE: ICD-10-CM

## 2025-03-13 DIAGNOSIS — S81.002A OPEN KNEE WOUND, LEFT, INITIAL ENCOUNTER: ICD-10-CM

## 2025-03-13 DIAGNOSIS — S81.002D OPEN KNEE WOUND, LEFT, SUBSEQUENT ENCOUNTER: ICD-10-CM

## 2025-03-13 PROCEDURE — 11042 DBRDMT SUBQ TIS 1ST 20SQCM/<: CPT

## 2025-03-13 NOTE — DISCHARGE INSTRUCTIONS
Caroline Hendrix RN  Registered Nurse     Wound Care     Signed     Date of Service: 3/13/2025 10:45 AM     Signed                          Wound Clinic Physician Orders and Discharge Instructions  [facility]  [facility] [address], [address]  [place] [address]  Telephone: [phone]     FAX [phone]     NAME:  Angelica Jansen  YOB: 1934  MEDICAL RECORD NUMBER:  [id number]  DATE:  3/13/2025        Return Appointment:  [] Dressing Supply Provider:   [] Home Healthcare:  [x] Return Appointment: 3 Week(s)  [] Nurse Visit:       [] Discharge from [facility]: [] Healed        [] Refer to Provider:         [] Consult     Follow-up Information:  [] Ordered Tests:   [x] Referrals:  PCP FOR ELEVATED BLOOD PRESSURE - PATIENT TO MONITOR AT HOME  [] Rx:   [] Would Culture:  [x] Other: CONSIDER SURGERY- IF WOUND DOESN'T HEAL         Wound Cleansing:   Do not scrub or use excessive force.  Cleanse wound prior to applying a clean dressing with:  [x] Normal Saline OR    [x] Cleanse wound with Mild Soap & Water     [] Wound Cleanser   [] Keep Wound Dry in Shower  [] Wear a cast cover to shower  [] Other:        Topical Treatments:  Do not apply lotions, creams, or ointments to wound bed unless directed.   [] Apply moisturizing lotion to skin surrounding the wound prior to dressing change.  [] Apply antifungal ointment to skin surrounding the wound prior to dressing change.  [] Apply thin film of moisture barrier ointment to skin immediately around wound.  [] Apply Betadine to skin immediately around wound   [] Apply Skin Prep to skin immediately around wound  [] Other:                 Dressings:                  Wound Location LEFT KNEE           [x] Apply Primary Dressing:                                          [] MediHoney Gel      [] MediHoney Alginate  [x] Calcium Alginate with Silver   [] Calcium Alginate without silver              [] Collagen with silver            []

## 2025-03-13 NOTE — WOUND CARE
Wound Clinic Physician Orders and Discharge Instructions  [facility]  [address], [address]  [place] [address]  Telephone: [phone]     FAX [phone]    NAME:  Angelica Jansen  YOB: 1934  MEDICAL RECORD NUMBER:  [id number]  DATE:  3/13/2025      Return Appointment:  [] Dressing Supply Provider:   [] Home Healthcare:  [x] Return Appointment: 3 Week(s)  [] Nurse Visit:      [] Discharge from [facility]: [] Healed        [] Refer to Provider:         [] Consult    Follow-up Information:  [] Ordered Tests:   [x] Referrals:  PCP FOR ELEVATED BLOOD PRESSURE - PATIENT TO MONITOR AT HOME  [] Rx:   [] Would Culture:  [x] Other: CONSIDER SURGERY- IF WOUND DOESN'T HEAL       Wound Cleansing:   Do not scrub or use excessive force.  Cleanse wound prior to applying a clean dressing with:  [x] Normal Saline OR   [x] Cleanse wound with Mild Soap & Water     [] Wound Cleanser   [] Keep Wound Dry in Shower  [] Wear a cast cover to shower  [] Other:       Topical Treatments:  Do not apply lotions, creams, or ointments to wound bed unless directed.   [] Apply moisturizing lotion to skin surrounding the wound prior to dressing change.  [] Apply antifungal ointment to skin surrounding the wound prior to dressing change.  [] Apply thin film of moisture barrier ointment to skin immediately around wound.  [] Apply Betadine to skin immediately around wound   [] Apply Skin Prep to skin immediately around wound  [] Other:      Dressings:           Wound Location LEFT KNEE    [x] Apply Primary Dressing:       [] MediHoney Gel [] MediHoney Alginate  [x] Calcium Alginate with Silver   [] Calcium Alginate without silver   [] Collagen with silver [] Collagen without Silver    [] Santyl with moist saline gauze     [] Hydrofera Blue (cut to size and moistened with normal saline)  [] Hydrofera Blue Ready (cut to size)      [] Normal Saline wet to dry  [] Betadine wet to dry    [] 
no activity restrictions      [] Strict Bedrest           [] Remain off Work:       [] May return to full duty work                                     [] Return to work with restrictions:         Physician:  [] Dr. Renae Nicole  [] Dr. Brook Jo   [] Dr. Amira Greenwood  [] Phillip Doyle PA-C  [x] Dr. Che Remy  [] Dr. Ze Ferguson  [] Dr. Fabienne Otero     Nurse Case Manger:  MARS BARRERA RN        Wound Care Center Information: Should you experience any significant changes in your wound(s) or have questions about your wound care, please contact the Wound Center at [phone]. Our hours are Monday - Friday 8am - 4:30pm, closed all major holidays. If you need help with your wound outside these hours and cannot wait until we are again available, contact your PCP or go to the hospital emergency room.      PLEASE NOTE: IF YOU ARE UNABLE TO OBTAIN WOUND SUPPLIES, CONTINUE TO USE THE SUPPLIES YOU HAVE AVAILABLE UNTIL YOU ARE ABLE TO REACH US. IT IS MOST IMPORTANT TO KEEP THE WOUND COVERED AT ALL TIMES.                             Electronically signed by Che Remy MD on 3/13/2025 at 11:37 AM

## 2025-04-02 ENCOUNTER — TELEPHONE (OUTPATIENT)
Age: 89
End: 2025-04-02

## 2025-04-02 NOTE — TELEPHONE ENCOUNTER
Patient called and lvm requesting a phone number for wound center. Called patient and lvm. Gave patient the phone number for [facility] wound center phone number [phone]

## 2025-04-08 ENCOUNTER — FOLLOWUP TELEPHONE ENCOUNTER (OUTPATIENT)
Facility: HOSPITAL | Age: 89
End: 2025-04-08

## 2025-04-08 NOTE — TELEPHONE ENCOUNTER
Called patient to check on wound status and to see if patient needs to schedule a follow up appointment. Left voicemail for patient to return call.

## 2025-04-14 ENCOUNTER — FOLLOWUP TELEPHONE ENCOUNTER (OUTPATIENT)
Facility: HOSPITAL | Age: 89
End: 2025-04-14

## 2025-04-24 ENCOUNTER — HOSPITAL ENCOUNTER (OUTPATIENT)
Facility: HOSPITAL | Age: 89
Discharge: HOME | End: 2025-04-24
Attending: SURGERY
Payer: MEDICARE

## 2025-04-24 VITALS
TEMPERATURE: 98.1 F | HEART RATE: 77 BPM | SYSTOLIC BLOOD PRESSURE: 179 MMHG | RESPIRATION RATE: 16 BRPM | DIASTOLIC BLOOD PRESSURE: 88 MMHG

## 2025-04-24 DIAGNOSIS — M00.9 CHRONIC INFECTION OF LEFT KNEE (HCC): Primary | ICD-10-CM

## 2025-04-24 DIAGNOSIS — L02.416 CUTANEOUS ABSCESS OF LEFT KNEE: ICD-10-CM

## 2025-04-24 DIAGNOSIS — S81.002D OPEN KNEE WOUND, LEFT, SUBSEQUENT ENCOUNTER: ICD-10-CM

## 2025-04-24 DIAGNOSIS — S81.002A OPEN KNEE WOUND, LEFT, INITIAL ENCOUNTER: ICD-10-CM

## 2025-04-24 PROCEDURE — 11042 DBRDMT SUBQ TIS 1ST 20SQCM/<: CPT

## 2025-04-24 RX ORDER — TRIAMCINOLONE ACETONIDE 1 MG/G
OINTMENT TOPICAL PRN
OUTPATIENT
Start: 2025-04-24

## 2025-04-24 RX ORDER — LIDOCAINE 40 MG/G
CREAM TOPICAL PRN
Status: CANCELLED | OUTPATIENT
Start: 2025-04-24

## 2025-04-24 RX ORDER — LIDOCAINE HYDROCHLORIDE 20 MG/ML
JELLY TOPICAL PRN
Status: CANCELLED | OUTPATIENT
Start: 2025-04-24

## 2025-04-24 RX ORDER — LIDOCAINE HYDROCHLORIDE 40 MG/ML
SOLUTION TOPICAL PRN
Status: CANCELLED | OUTPATIENT
Start: 2025-04-24

## 2025-04-24 RX ORDER — MUPIROCIN 20 MG/G
OINTMENT TOPICAL PRN
Status: CANCELLED | OUTPATIENT
Start: 2025-04-24

## 2025-04-24 RX ORDER — SILVER SULFADIAZINE 10 MG/G
CREAM TOPICAL PRN
OUTPATIENT
Start: 2025-04-24

## 2025-04-24 RX ORDER — LIDOCAINE 50 MG/G
OINTMENT TOPICAL PRN
OUTPATIENT
Start: 2025-04-24

## 2025-04-24 RX ORDER — GENTAMICIN SULFATE 1 MG/G
OINTMENT TOPICAL PRN
OUTPATIENT
Start: 2025-04-24

## 2025-04-24 RX ORDER — MUPIROCIN 20 MG/G
OINTMENT TOPICAL PRN
OUTPATIENT
Start: 2025-04-24

## 2025-04-24 RX ORDER — SODIUM CHLOR/HYPOCHLOROUS ACID 0.033 %
SOLUTION, IRRIGATION IRRIGATION PRN
OUTPATIENT
Start: 2025-04-24

## 2025-04-24 RX ORDER — LIDOCAINE HYDROCHLORIDE 20 MG/ML
JELLY TOPICAL PRN
OUTPATIENT
Start: 2025-04-24

## 2025-04-24 RX ORDER — GINSENG 100 MG
CAPSULE ORAL PRN
Status: CANCELLED | OUTPATIENT
Start: 2025-04-24

## 2025-04-24 RX ORDER — CLOBETASOL PROPIONATE 0.5 MG/G
OINTMENT TOPICAL PRN
OUTPATIENT
Start: 2025-04-24

## 2025-04-24 RX ORDER — BETAMETHASONE DIPROPIONATE 0.5 MG/G
CREAM TOPICAL PRN
OUTPATIENT
Start: 2025-04-24

## 2025-04-24 RX ORDER — LIDOCAINE 50 MG/G
OINTMENT TOPICAL PRN
Status: CANCELLED | OUTPATIENT
Start: 2025-04-24

## 2025-04-24 RX ORDER — CLOBETASOL PROPIONATE 0.5 MG/G
OINTMENT TOPICAL PRN
Status: CANCELLED | OUTPATIENT
Start: 2025-04-24

## 2025-04-24 RX ORDER — LIDOCAINE HYDROCHLORIDE 40 MG/ML
SOLUTION TOPICAL PRN
OUTPATIENT
Start: 2025-04-24

## 2025-04-24 RX ORDER — LIDOCAINE 40 MG/G
CREAM TOPICAL PRN
OUTPATIENT
Start: 2025-04-24

## 2025-04-24 RX ORDER — GENTAMICIN SULFATE 1 MG/G
OINTMENT TOPICAL PRN
Status: CANCELLED | OUTPATIENT
Start: 2025-04-24

## 2025-04-24 RX ORDER — NEOMYCIN/BACITRACIN/POLYMYXINB 3.5-400-5K
OINTMENT (GRAM) TOPICAL PRN
Status: CANCELLED | OUTPATIENT
Start: 2025-04-24

## 2025-04-24 RX ORDER — BACITRACIN ZINC AND POLYMYXIN B SULFATE 500; 1000 [USP'U]/G; [USP'U]/G
OINTMENT TOPICAL PRN
OUTPATIENT
Start: 2025-04-24

## 2025-04-24 RX ORDER — BETAMETHASONE DIPROPIONATE 0.5 MG/G
CREAM TOPICAL PRN
Status: CANCELLED | OUTPATIENT
Start: 2025-04-24

## 2025-04-24 RX ORDER — SODIUM CHLOR/HYPOCHLOROUS ACID 0.033 %
SOLUTION, IRRIGATION IRRIGATION PRN
Status: CANCELLED | OUTPATIENT
Start: 2025-04-24

## 2025-04-24 RX ORDER — TRIAMCINOLONE ACETONIDE 1 MG/G
OINTMENT TOPICAL PRN
Status: CANCELLED | OUTPATIENT
Start: 2025-04-24

## 2025-04-24 RX ORDER — GINSENG 100 MG
CAPSULE ORAL PRN
OUTPATIENT
Start: 2025-04-24

## 2025-04-24 RX ORDER — BACITRACIN ZINC AND POLYMYXIN B SULFATE 500; 1000 [USP'U]/G; [USP'U]/G
OINTMENT TOPICAL PRN
Status: CANCELLED | OUTPATIENT
Start: 2025-04-24

## 2025-04-24 RX ORDER — SILVER SULFADIAZINE 10 MG/G
CREAM TOPICAL PRN
Status: CANCELLED | OUTPATIENT
Start: 2025-04-24

## 2025-04-24 RX ORDER — NEOMYCIN/BACITRACIN/POLYMYXINB 3.5-400-5K
OINTMENT (GRAM) TOPICAL PRN
OUTPATIENT
Start: 2025-04-24

## 2025-04-24 NOTE — DISCHARGE INSTRUCTIONS
Caroline Hendrix RN  Registered Nurse     Wound Care     Signed     Date of Service: 4/24/2025 10:00 AM     Signed                          Wound Clinic Physician Orders and Discharge Instructions  [facility]  [facility] [address], [address]  [place] [id number]  Telephone: [phone]     FAX [phone]     NAME:  Angelica Janesn  YOB: 1934  MEDICAL RECORD NUMBER:  [id number]  DATE:  4/24/2025        Return Appointment:  [] Dressing Supply Provider:   [] Home Healthcare:  [x] Return Appointment: 4 Week(s)  [] Nurse Visit:       [] Discharge from [facility]: [] Healed        [] Refer to Provider:         [] Consult     Follow-up Information:  [] Ordered Tests:   [x] Referrals:  PCP FOR ELEVATED BLOOD PRESSURE - PATIENT TO MONITOR AT HOME  [] Rx:   [] Would Culture:  [x] Other: CONSIDER SURGERY- IF WOUND DOESN'T HEAL         Wound Cleansing:   Do not scrub or use excessive force.  Cleanse wound prior to applying a clean dressing with:  [x] Normal Saline OR    [x] Cleanse wound with Mild Soap & Water     [] Wound Cleanser   [] Keep Wound Dry in Shower  [] Wear a cast cover to shower  [] Other:        Topical Treatments:  Do not apply lotions, creams, or ointments to wound bed unless directed.   [] Apply moisturizing lotion to skin surrounding the wound prior to dressing change.  [] Apply antifungal ointment to skin surrounding the wound prior to dressing change.  [] Apply thin film of moisture barrier ointment to skin immediately around wound.  [] Apply Betadine to skin immediately around wound   [] Apply Skin Prep to skin immediately around wound  [] Other:                 Dressings:                  Wound Location LEFT KNEE           [x] Apply Primary Dressing:                                          [] MediHoney Gel      [] MediHoney Alginate  [] Calcium Alginate with Silver   [] Calcium Alginate without silver              [] Collagen with silver            []

## 2025-05-19 ENCOUNTER — FOLLOWUP TELEPHONE ENCOUNTER (OUTPATIENT)
Facility: HOSPITAL | Age: 89
End: 2025-05-19

## 2025-06-04 ENCOUNTER — FOLLOW UP (OUTPATIENT)
Dept: URBAN - METROPOLITAN AREA CLINIC 67 | Facility: CLINIC | Age: OVER 89
End: 2025-06-04

## 2025-06-04 DIAGNOSIS — H35.3211: ICD-10-CM

## 2025-06-04 DIAGNOSIS — G45.3: ICD-10-CM

## 2025-06-04 DIAGNOSIS — H35.713: ICD-10-CM

## 2025-06-04 DIAGNOSIS — H35.3122: ICD-10-CM

## 2025-06-04 DIAGNOSIS — H43.813: ICD-10-CM

## 2025-06-04 PROCEDURE — 92134 CPTRZ OPH DX IMG PST SGM RTA: CPT | Mod: NC

## 2025-06-04 PROCEDURE — 67028 INJECTION EYE DRUG: CPT

## 2025-06-04 PROCEDURE — 92250 FUNDUS PHOTOGRAPHY W/I&R: CPT

## 2025-06-04 PROCEDURE — 92014 COMPRE OPH EXAM EST PT 1/>: CPT | Mod: 25

## 2025-06-04 PROCEDURE — 92202 OPSCPY EXTND ON/MAC DRAW: CPT | Mod: NC

## 2025-06-04 ASSESSMENT — VISUAL ACUITY
OS_SC: 20/25-2
OD_PH: 20/30
OD_SC: 20/60+1

## 2025-06-04 ASSESSMENT — TONOMETRY
OD_IOP_MMHG: 20
OS_IOP_MMHG: 18

## (undated) DEVICE — SPONGE GZ W4XL4IN COT 12 PLY TYP VII WVN C FLD DSGN STERILE

## (undated) DEVICE — SUTURE VCRL RAPIDE SZ 4-0 L18IN ABSRB UD PS-3 L13MM 3/8 CIR VR494

## (undated) DEVICE — BANDAGE COBAN 4 IN COMPR W4INXL5YD FOAM COHESIVE QUIK STK SELF ADH SFT

## (undated) DEVICE — INTENDED FOR TISSUE SEPARATION, AND OTHER PROCEDURES THAT REQUIRE A SHARP SURGICAL BLADE TO PUNCTURE OR CUT.: Brand: BARD-PARKER SAFETY BLADES SIZE 15, STERILE

## (undated) DEVICE — PADDING CAST W4INXL4YD ST COT COHESIVE HND TEARABLE SPEC

## (undated) DEVICE — ADHESIVE SKIN CLOSURE WND 8.661X1.5 IN 22 CM LIQUIBAND SECUR

## (undated) DEVICE — SUTURE MCRYL + SZ 4-0 L27IN ABSRB UD L19MM PS-2 3/8 CIR MCP426H

## (undated) DEVICE — GUIDEWIRE ARTHSCP DIA1.35MM FOR PAT FRAC SYS

## (undated) DEVICE — SOLUTION IV 1000ML 0.9% SOD CHL PH 5 INJ USP VIAFLX PLAS

## (undated) DEVICE — SUTURE VCRL SZ 2-0 L27IN ABSRB UD L26MM CT-2 1/2 CIR J269H

## (undated) DEVICE — GARMENT,MEDLINE,DVT,INT,CALF,MED, GEN2: Brand: MEDLINE

## (undated) DEVICE — APPLICATOR MEDICATED 26 CC SOLUTION HI LT ORNG CHLORAPREP

## (undated) DEVICE — SKIN PREP TRAY 4 COMPARTM TRAY: Brand: MEDLINE INDUSTRIES, INC.

## (undated) DEVICE — ELECTRODE PT RET AD L9FT HI MOIST COND ADH HYDRGEL CORDED

## (undated) DEVICE — 3M™ COBAN™ NL STERILE NON-LATEX SELF-ADHERENT WRAP, 2084S, 4 IN X 5 YD (10 CM X 4,5 M), 18 ROLLS/CASE: Brand: 3M™ COBAN™

## (undated) DEVICE — GOWN,SIRUS,POLYRNF,BRTHSLV,XL,30/CS: Brand: MEDLINE

## (undated) DEVICE — THE STERILE LIGHT HANDLE COVER IS USED WITH STERIS SURGICAL LIGHTING AND VISUALIZATION SYSTEMS.

## (undated) DEVICE — DRAPE,REIN 53X77,STERILE: Brand: MEDLINE

## (undated) DEVICE — BLADE,CARBON-STEEL,15,STRL,DISPOSABLE,TB: Brand: MEDLINE

## (undated) DEVICE — UPPER EXTREMITY: Brand: MEDLINE INDUSTRIES, INC.

## (undated) DEVICE — C-ARMOR C-ARM EQUIPMENT COVERS CLEAR STERILE UNIVERSAL FIT 12 PER CASE: Brand: C-ARMOR

## (undated) DEVICE — GAUZE,SPONGE,4"X4",16PLY,STRL,LF,10/TRAY: Brand: MEDLINE

## (undated) DEVICE — DRAPE,ORTHOMAX,EXTREMITY: Brand: MEDLINE

## (undated) DEVICE — ZIMMER® STERILE DISPOSABLE TOURNIQUET CUFF WITH PLC, DUAL PORT, SINGLE BLADDER, 18 IN. (46 CM)

## (undated) DEVICE — INTENDED FOR TISSUE SEPARATION, AND OTHER PROCEDURES THAT REQUIRE A SHARP SURGICAL BLADE TO PUNCTURE OR CUT.: Brand: BARD-PARKER SAFETY BLADES SIZE 10, STERILE

## (undated) DEVICE — 3M™ STERI-STRIP™ REINFORCED ADHESIVE SKIN CLOSURES, R1547, 1/2 IN X 4 IN (12 MM X 100 MM), 6 STRIPS/ENVELOPE: Brand: 3M™ STERI-STRIP™

## (undated) DEVICE — PASSER SUT 90DEG STR NIT WIRE LOOP 2 FIBERSTICK MFIL SUT

## (undated) DEVICE — SUTURE TIGERTAPE CERCLAGE W/O NEEDLE

## (undated) DEVICE — ZIMMER® STERILE DISPOSABLE TOURNIQUET CUFF WITH PROTECTIVE SLEEVE AND PLC, DUAL PORT, SINGLE BLADDER, 34 IN. (86 CM)

## (undated) DEVICE — MINOR EXTREMITY PACK: Brand: MEDLINE INDUSTRIES, INC.

## (undated) DEVICE — 3M(TM) MEDIPORE(TM) +PAD SOFT CLOTH ADHESIVE WOUND DRESSING 3570: Brand: 3M™ MEDIPORE™

## (undated) DEVICE — DRESSING FOAM POST OPERATIVE 4X10 IN MEPILEX BORDER AG

## (undated) DEVICE — TAPE FIBER CARCLAGE W/ TIGER LINK

## (undated) DEVICE — HANDPIECE SET WITH HIGH FLOW TIP AND SUCTION TUBE: Brand: INTERPULSE

## (undated) DEVICE — SOUTHSIDE TURNOVER: Brand: MEDLINE INDUSTRIES, INC.

## (undated) DEVICE — SUTURE VCRL SZ 0 L36IN ABSRB VLT L36MM CT-1 1/2 CIR J346H

## (undated) DEVICE — GLOVE ORTHO 7   MSG9470

## (undated) DEVICE — STAPLER SKIN H3.9MM WIRE DIA0.58MM CRWN 6.9MM 35 STPL FIX

## (undated) DEVICE — PADDING CAST W6INXL4YD ST COT COHESIVE HND TEARABLE SPEC

## (undated) DEVICE — TUBING, SUCTION, 1/4" X 12', STRAIGHT: Brand: MEDLINE

## (undated) DEVICE — PADDING CAST W6INXL4YD NONSTERILE COT RAYON MICROPLEATED

## (undated) DEVICE — SUTURE ETHLN SZ 3-0 L18IN NONABSORBABLE BLK L24MM FS-1 3/8 663G

## (undated) DEVICE — PENCIL ES CRD L10FT HND SWCHING ROCK SWCH W/ EDGE COAT BLDE

## (undated) DEVICE — GLOVE SURG SZ 7 L12IN FNGR THK79MIL GRN LTX FREE

## (undated) DEVICE — SPONGE LAPAROTOMY W18XL18IN WHITE STRUNG RADIOPAQUE STERILE

## (undated) DEVICE — ZIMMER® STERILE DISPOSABLE TOURNIQUET CUFF WITH PLC, DUAL PORT, SINGLE BLADDER, 34 IN. (86 CM)

## (undated) DEVICE — BANDAGE E W6INXL11YD CLP CLSR DBL LEN FLEX-MASTER

## (undated) DEVICE — 3M™ STERI-DRAPE™ U-DRAPE 1015: Brand: STERI-DRAPE™

## (undated) DEVICE — SUTURE FIBERWIRE SZ 5 L38IN BLU L48MM 1/2 CIR CONVENTIONAL AR7213

## (undated) DEVICE — LOTION PREP REMV 5OZ IODO CLR TINC OF BENZ DURAPREP

## (undated) DEVICE — CANISTER, RIGID, 3000CC: Brand: MEDLINE INDUSTRIES, INC.

## (undated) DEVICE — DRAPE EQUIP C ARM 74X42 IN MOB XR W/ TIE RUBBER BND LF

## (undated) DEVICE — INTENT TO BE USED WITH SUTURE MATERIAL FOR TISSUE CLOSURE: Brand: RICHARD-ALLAN® NEEDLE 1/2 CIRCLE TAPER

## (undated) DEVICE — SOLUTION IRRIG 3000ML 0.9% SOD CHL USP UROMATIC PLAS CONT

## (undated) DEVICE — PADDING CAST W4INXL4YD ST COT RAYON MICROPLEATED HIGHLY

## (undated) DEVICE — SUTURE MCRYL + SZ 3-0 L27IN ABSRB UD L26MM SH 1/2 CIR MCP416H

## (undated) DEVICE — SUTURE VCRL + SZ 1 L36IN ABSRB UD L36MM CT-1 1/2 CIR VCP947H

## (undated) DEVICE — BANDAGE,GAUZE,CONFORMING,4"X75",STRL,LF: Brand: MEDLINE

## (undated) DEVICE — BASIC SINGLE BASIN-LF: Brand: MEDLINE INDUSTRIES, INC.

## (undated) DEVICE — PICO 7 10CM X 20CM: Brand: PICO™ 7

## (undated) DEVICE — SUTURE VCRL + SZ 2-0 L36IN ABSRB UD L36MM CT-1 1/2 CIR VCP945H

## (undated) DEVICE — TENSIONER SUTURE

## (undated) DEVICE — INTENDED FOR TISSUE SEPARATION, AND OTHER PROCEDURES THAT REQUIRE A SHARP SURGICAL BLADE TO PUNCTURE OR CUT.: Brand: BARD-PARKER ® CARBON RIB-BACK BLADES

## (undated) DEVICE — PAD,ABDOMINAL,5"X9",ST,LF,25/BX: Brand: MEDLINE INDUSTRIES, INC.

## (undated) DEVICE — BANDAGE COMPR W6INXL5YD WHT BGE POLY COT M E WRP WV HK AND